# Patient Record
Sex: FEMALE | Race: WHITE | NOT HISPANIC OR LATINO | URBAN - METROPOLITAN AREA
[De-identification: names, ages, dates, MRNs, and addresses within clinical notes are randomized per-mention and may not be internally consistent; named-entity substitution may affect disease eponyms.]

---

## 2017-08-13 ENCOUNTER — EMERGENCY (EMERGENCY)
Facility: HOSPITAL | Age: 77
LOS: 1 days | Discharge: HOME | End: 2017-08-13
Attending: EMERGENCY MEDICINE | Admitting: EMERGENCY MEDICINE
Payer: MEDICARE

## 2017-08-13 VITALS
HEIGHT: 61 IN | RESPIRATION RATE: 14 BRPM | TEMPERATURE: 104 F | WEIGHT: 147.93 LBS | DIASTOLIC BLOOD PRESSURE: 73 MMHG | OXYGEN SATURATION: 98 % | SYSTOLIC BLOOD PRESSURE: 138 MMHG | HEART RATE: 88 BPM

## 2017-08-13 VITALS
SYSTOLIC BLOOD PRESSURE: 120 MMHG | TEMPERATURE: 99 F | OXYGEN SATURATION: 99 % | RESPIRATION RATE: 16 BRPM | HEART RATE: 70 BPM | DIASTOLIC BLOOD PRESSURE: 54 MMHG

## 2017-08-13 DIAGNOSIS — Z90.721 ACQUIRED ABSENCE OF OVARIES, UNILATERAL: Chronic | ICD-10-CM

## 2017-08-13 LAB
ALBUMIN SERPL ELPH-MCNC: 3.2 G/DL — LOW (ref 3.3–5)
ALP SERPL-CCNC: 95 U/L — SIGNIFICANT CHANGE UP (ref 30–120)
ALT FLD-CCNC: 36 U/L DA — SIGNIFICANT CHANGE UP (ref 10–60)
ANION GAP SERPL CALC-SCNC: 11 MMOL/L — SIGNIFICANT CHANGE UP (ref 5–17)
APPEARANCE UR: CLEAR — SIGNIFICANT CHANGE UP
APTT BLD: 29.6 SEC — SIGNIFICANT CHANGE UP (ref 27.5–37.4)
AST SERPL-CCNC: 26 U/L — SIGNIFICANT CHANGE UP (ref 10–40)
BACTERIA # UR AUTO: ABNORMAL
BASOPHILS # BLD AUTO: 0.2 K/UL — SIGNIFICANT CHANGE UP (ref 0–0.2)
BASOPHILS NFR BLD AUTO: 1.7 % — SIGNIFICANT CHANGE UP (ref 0–2)
BILIRUB SERPL-MCNC: 0.6 MG/DL — SIGNIFICANT CHANGE UP (ref 0.2–1.2)
BILIRUB UR-MCNC: NEGATIVE — SIGNIFICANT CHANGE UP
BUN SERPL-MCNC: 18 MG/DL — SIGNIFICANT CHANGE UP (ref 7–23)
CALCIUM SERPL-MCNC: 9.1 MG/DL — SIGNIFICANT CHANGE UP (ref 8.4–10.5)
CHLORIDE SERPL-SCNC: 100 MMOL/L — SIGNIFICANT CHANGE UP (ref 96–108)
CO2 SERPL-SCNC: 26 MMOL/L — SIGNIFICANT CHANGE UP (ref 22–31)
COLOR SPEC: YELLOW — SIGNIFICANT CHANGE UP
CREAT SERPL-MCNC: 1.03 MG/DL — SIGNIFICANT CHANGE UP (ref 0.5–1.3)
DIFF PNL FLD: ABNORMAL
EOSINOPHIL # BLD AUTO: 0.1 K/UL — SIGNIFICANT CHANGE UP (ref 0–0.5)
EOSINOPHIL NFR BLD AUTO: 0.7 % — SIGNIFICANT CHANGE UP (ref 0–6)
EPI CELLS # UR: SIGNIFICANT CHANGE UP
GLUCOSE SERPL-MCNC: 122 MG/DL — HIGH (ref 70–99)
GLUCOSE UR QL: NEGATIVE MG/DL — SIGNIFICANT CHANGE UP
HCT VFR BLD CALC: 41.1 % — SIGNIFICANT CHANGE UP (ref 34.5–45)
HGB BLD-MCNC: 13.8 G/DL — SIGNIFICANT CHANGE UP (ref 11.5–15.5)
INR BLD: 1.03 RATIO — SIGNIFICANT CHANGE UP (ref 0.88–1.16)
KETONES UR-MCNC: NEGATIVE — SIGNIFICANT CHANGE UP
LACTATE SERPL-SCNC: 1.1 MMOL/L — SIGNIFICANT CHANGE UP (ref 0.7–2)
LEUKOCYTE ESTERASE UR-ACNC: ABNORMAL
LYMPHOCYTES # BLD AUTO: 0.3 K/UL — LOW (ref 1–3.3)
LYMPHOCYTES # BLD AUTO: 4 % — LOW (ref 13–44)
MANUAL DIF COMMENT BLD-IMP: SIGNIFICANT CHANGE UP
MANUAL SMEAR VERIFICATION: SIGNIFICANT CHANGE UP
MCHC RBC-ENTMCNC: 30.5 PG — SIGNIFICANT CHANGE UP (ref 27–34)
MCHC RBC-ENTMCNC: 33.6 GM/DL — SIGNIFICANT CHANGE UP (ref 32–36)
MCV RBC AUTO: 90.7 FL — SIGNIFICANT CHANGE UP (ref 80–100)
MONOCYTES # BLD AUTO: 0.5 K/UL — SIGNIFICANT CHANGE UP (ref 0–0.9)
MONOCYTES NFR BLD AUTO: 3 % — SIGNIFICANT CHANGE UP (ref 2–14)
NEUTROPHILS # BLD AUTO: 11.4 K/UL — HIGH (ref 1.8–7.4)
NEUTROPHILS NFR BLD AUTO: 90 % — HIGH (ref 43–77)
NEUTS BAND # BLD: 3 % — SIGNIFICANT CHANGE UP (ref 0–8)
NITRITE UR-MCNC: NEGATIVE — SIGNIFICANT CHANGE UP
PH UR: 5 — SIGNIFICANT CHANGE UP (ref 5–8)
PLAT MORPH BLD: NORMAL — SIGNIFICANT CHANGE UP
PLATELET # BLD AUTO: 231 K/UL — SIGNIFICANT CHANGE UP (ref 150–400)
POTASSIUM SERPL-MCNC: 4.1 MMOL/L — SIGNIFICANT CHANGE UP (ref 3.5–5.3)
POTASSIUM SERPL-SCNC: 4.1 MMOL/L — SIGNIFICANT CHANGE UP (ref 3.5–5.3)
PROT SERPL-MCNC: 7.2 G/DL — SIGNIFICANT CHANGE UP (ref 6–8.3)
PROT UR-MCNC: 15 MG/DL
PROTHROM AB SERPL-ACNC: 11.2 SEC — SIGNIFICANT CHANGE UP (ref 9.8–12.7)
RBC # BLD: 4.53 M/UL — SIGNIFICANT CHANGE UP (ref 3.8–5.2)
RBC # FLD: 11.2 % — SIGNIFICANT CHANGE UP (ref 10.3–14.5)
RBC BLD AUTO: NORMAL — SIGNIFICANT CHANGE UP
RBC CASTS # UR COMP ASSIST: ABNORMAL /HPF (ref 0–4)
SODIUM SERPL-SCNC: 137 MMOL/L — SIGNIFICANT CHANGE UP (ref 135–145)
SP GR SPEC: 1.01 — SIGNIFICANT CHANGE UP (ref 1.01–1.02)
UROBILINOGEN FLD QL: NEGATIVE MG/DL — SIGNIFICANT CHANGE UP
WBC # BLD: 12.5 K/UL — HIGH (ref 3.8–10.5)
WBC # FLD AUTO: 12.5 K/UL — HIGH (ref 3.8–10.5)
WBC UR QL: SIGNIFICANT CHANGE UP

## 2017-08-13 PROCEDURE — 71020: CPT | Mod: 26

## 2017-08-13 PROCEDURE — 87040 BLOOD CULTURE FOR BACTERIA: CPT

## 2017-08-13 PROCEDURE — 85730 THROMBOPLASTIN TIME PARTIAL: CPT

## 2017-08-13 PROCEDURE — 71046 X-RAY EXAM CHEST 2 VIEWS: CPT

## 2017-08-13 PROCEDURE — 99285 EMERGENCY DEPT VISIT HI MDM: CPT

## 2017-08-13 PROCEDURE — 87086 URINE CULTURE/COLONY COUNT: CPT

## 2017-08-13 PROCEDURE — 86618 LYME DISEASE ANTIBODY: CPT

## 2017-08-13 PROCEDURE — 81001 URINALYSIS AUTO W/SCOPE: CPT

## 2017-08-13 PROCEDURE — 96374 THER/PROPH/DIAG INJ IV PUSH: CPT

## 2017-08-13 PROCEDURE — 99284 EMERGENCY DEPT VISIT MOD MDM: CPT | Mod: 25

## 2017-08-13 PROCEDURE — 93010 ELECTROCARDIOGRAM REPORT: CPT

## 2017-08-13 PROCEDURE — 85610 PROTHROMBIN TIME: CPT

## 2017-08-13 PROCEDURE — 93005 ELECTROCARDIOGRAM TRACING: CPT

## 2017-08-13 PROCEDURE — 85027 COMPLETE CBC AUTOMATED: CPT

## 2017-08-13 PROCEDURE — 83605 ASSAY OF LACTIC ACID: CPT

## 2017-08-13 PROCEDURE — 80053 COMPREHEN METABOLIC PANEL: CPT

## 2017-08-13 RX ORDER — ACETAMINOPHEN 500 MG
650 TABLET ORAL ONCE
Qty: 0 | Refills: 0 | Status: COMPLETED | OUTPATIENT
Start: 2017-08-13 | End: 2017-08-13

## 2017-08-13 RX ORDER — CEFTRIAXONE 500 MG/1
1 INJECTION, POWDER, FOR SOLUTION INTRAMUSCULAR; INTRAVENOUS ONCE
Qty: 0 | Refills: 0 | Status: COMPLETED | OUTPATIENT
Start: 2017-08-13 | End: 2017-08-13

## 2017-08-13 RX ORDER — SODIUM CHLORIDE 9 MG/ML
1000 INJECTION INTRAMUSCULAR; INTRAVENOUS; SUBCUTANEOUS
Qty: 0 | Refills: 0 | Status: COMPLETED | OUTPATIENT
Start: 2017-08-13 | End: 2017-08-13

## 2017-08-13 RX ADMIN — SODIUM CHLORIDE 1000 MILLILITER(S): 9 INJECTION INTRAMUSCULAR; INTRAVENOUS; SUBCUTANEOUS at 12:13

## 2017-08-13 RX ADMIN — SODIUM CHLORIDE 1000 MILLILITER(S): 9 INJECTION INTRAMUSCULAR; INTRAVENOUS; SUBCUTANEOUS at 09:51

## 2017-08-13 RX ADMIN — Medication 650 MILLIGRAM(S): at 09:53

## 2017-08-13 RX ADMIN — SODIUM CHLORIDE 1000 MILLILITER(S): 9 INJECTION INTRAMUSCULAR; INTRAVENOUS; SUBCUTANEOUS at 11:00

## 2017-08-13 RX ADMIN — CEFTRIAXONE 100 GRAM(S): 500 INJECTION, POWDER, FOR SOLUTION INTRAMUSCULAR; INTRAVENOUS at 09:53

## 2017-08-13 NOTE — ED PROVIDER NOTE - CHPI ED SYMPTOMS NEG
no dizziness/no pain/no nausea/no vomiting/no weakness/no numbness/no tingling/no decreased eating/drinking

## 2017-08-13 NOTE — ED PROVIDER NOTE - OBJECTIVE STATEMENT
76 yo female co fever for 2 days and shaking chills for 2 hrs. T=103 now. Denies headache, cough, SOB, vomiting, diarrhea dysuria or hematuria. Recently treated for gastroenteritis 2 weeks ago. Concerned about Lyme since she was in [facility] last week. Denies any tick bite.

## 2017-08-13 NOTE — ED PROVIDER NOTE - FAMILY HISTORY
<<-----Click on this checkbox to enter Family History Family history of stroke     Family history of diabetes mellitus     Father  Still living? Unknown  Family history of lung cancer, Age at diagnosis: Age Unknown

## 2017-08-13 NOTE — ED PROVIDER NOTE - PROGRESS NOTE DETAILS
Afebrile. Feels better. Ate lunch, drank fluids. Plan - Will go home and see Dr. Orosco tomorrow for results of Lyme and Blood cultures.

## 2017-08-13 NOTE — ED PROVIDER NOTE - MEDICAL DECISION MAKING DETAILS
78 yo emale with fever and chills to 103 today. PE other wise unrevealing. Plan - Labs, sepsis workup, IV fluids, tylenol, antibiotic. Reeval.

## 2017-08-14 LAB
B BURGDOR C6 AB SER-ACNC: NEGATIVE — SIGNIFICANT CHANGE UP
B BURGDOR IGG+IGM SER-ACNC: <0.01 INDEX — SIGNIFICANT CHANGE UP (ref 0.01–0.89)
CULTURE RESULTS: NO GROWTH — SIGNIFICANT CHANGE UP
SPECIMEN SOURCE: SIGNIFICANT CHANGE UP

## 2017-08-18 LAB
CULTURE RESULTS: SIGNIFICANT CHANGE UP
CULTURE RESULTS: SIGNIFICANT CHANGE UP
SPECIMEN SOURCE: SIGNIFICANT CHANGE UP
SPECIMEN SOURCE: SIGNIFICANT CHANGE UP

## 2021-12-17 ENCOUNTER — TRANSCRIPTION ENCOUNTER (OUTPATIENT)
Age: 81
End: 2021-12-17

## 2021-12-27 ENCOUNTER — INPATIENT (INPATIENT)
Facility: HOSPITAL | Age: 81
LOS: 2 days | Discharge: HOME | DRG: 299 | End: 2021-12-30
Attending: INTERNAL MEDICINE | Admitting: INTERNAL MEDICINE
Payer: MEDICARE

## 2021-12-27 VITALS
OXYGEN SATURATION: 96 % | HEART RATE: 86 BPM | DIASTOLIC BLOOD PRESSURE: 71 MMHG | TEMPERATURE: 98 F | SYSTOLIC BLOOD PRESSURE: 116 MMHG | RESPIRATION RATE: 16 BRPM | HEIGHT: 61 IN | WEIGHT: 156.09 LBS

## 2021-12-27 DIAGNOSIS — I82.409 ACUTE EMBOLISM AND THROMBOSIS OF UNSPECIFIED DEEP VEINS OF UNSPECIFIED LOWER EXTREMITY: ICD-10-CM

## 2021-12-27 DIAGNOSIS — E78.5 HYPERLIPIDEMIA, UNSPECIFIED: ICD-10-CM

## 2021-12-27 DIAGNOSIS — R07.9 CHEST PAIN, UNSPECIFIED: ICD-10-CM

## 2021-12-27 DIAGNOSIS — Z90.721 ACQUIRED ABSENCE OF OVARIES, UNILATERAL: Chronic | ICD-10-CM

## 2021-12-27 DIAGNOSIS — E11.9 TYPE 2 DIABETES MELLITUS WITHOUT COMPLICATIONS: ICD-10-CM

## 2021-12-27 DIAGNOSIS — E03.9 HYPOTHYROIDISM, UNSPECIFIED: ICD-10-CM

## 2021-12-27 DIAGNOSIS — R09.89 OTHER SPECIFIED SYMPTOMS AND SIGNS INVOLVING THE CIRCULATORY AND RESPIRATORY SYSTEMS: ICD-10-CM

## 2021-12-27 DIAGNOSIS — I26.99 OTHER PULMONARY EMBOLISM WITHOUT ACUTE COR PULMONALE: ICD-10-CM

## 2021-12-27 DIAGNOSIS — Z29.9 ENCOUNTER FOR PROPHYLACTIC MEASURES, UNSPECIFIED: ICD-10-CM

## 2021-12-27 DIAGNOSIS — D72.829 ELEVATED WHITE BLOOD CELL COUNT, UNSPECIFIED: ICD-10-CM

## 2021-12-27 DIAGNOSIS — J45.909 UNSPECIFIED ASTHMA, UNCOMPLICATED: ICD-10-CM

## 2021-12-27 LAB
ALBUMIN SERPL ELPH-MCNC: 3 G/DL — LOW (ref 3.3–5)
ALP SERPL-CCNC: 100 U/L — SIGNIFICANT CHANGE UP (ref 30–120)
ALT FLD-CCNC: 24 U/L DA — SIGNIFICANT CHANGE UP (ref 10–60)
ANION GAP SERPL CALC-SCNC: 9 MMOL/L — SIGNIFICANT CHANGE UP (ref 5–17)
APPEARANCE UR: CLEAR — SIGNIFICANT CHANGE UP
APTT BLD: 33.9 SEC — SIGNIFICANT CHANGE UP (ref 27.5–35.5)
AST SERPL-CCNC: 17 U/L — SIGNIFICANT CHANGE UP (ref 10–40)
BASOPHILS # BLD AUTO: 0.03 K/UL — SIGNIFICANT CHANGE UP (ref 0–0.2)
BASOPHILS NFR BLD AUTO: 0.2 % — SIGNIFICANT CHANGE UP (ref 0–2)
BILIRUB SERPL-MCNC: 0.3 MG/DL — SIGNIFICANT CHANGE UP (ref 0.2–1.2)
BILIRUB UR-MCNC: NEGATIVE — SIGNIFICANT CHANGE UP
BUN SERPL-MCNC: 23 MG/DL — SIGNIFICANT CHANGE UP (ref 7–23)
CALCIUM SERPL-MCNC: 8.8 MG/DL — SIGNIFICANT CHANGE UP (ref 8.4–10.5)
CHLORIDE SERPL-SCNC: 101 MMOL/L — SIGNIFICANT CHANGE UP (ref 96–108)
CO2 SERPL-SCNC: 27 MMOL/L — SIGNIFICANT CHANGE UP (ref 22–31)
COLOR SPEC: YELLOW — SIGNIFICANT CHANGE UP
CREAT SERPL-MCNC: 0.86 MG/DL — SIGNIFICANT CHANGE UP (ref 0.5–1.3)
DIFF PNL FLD: ABNORMAL
EOSINOPHIL # BLD AUTO: 0.03 K/UL — SIGNIFICANT CHANGE UP (ref 0–0.5)
EOSINOPHIL NFR BLD AUTO: 0.2 % — SIGNIFICANT CHANGE UP (ref 0–6)
GLUCOSE SERPL-MCNC: 110 MG/DL — HIGH (ref 70–99)
GLUCOSE UR QL: NEGATIVE MG/DL — SIGNIFICANT CHANGE UP
HCT VFR BLD CALC: 40 % — SIGNIFICANT CHANGE UP (ref 34.5–45)
HGB BLD-MCNC: 13 G/DL — SIGNIFICANT CHANGE UP (ref 11.5–15.5)
IMM GRANULOCYTES NFR BLD AUTO: 0.5 % — SIGNIFICANT CHANGE UP (ref 0–1.5)
INR BLD: 1.19 RATIO — HIGH (ref 0.88–1.16)
KETONES UR-MCNC: NEGATIVE — SIGNIFICANT CHANGE UP
LACTATE SERPL-SCNC: 0.6 MMOL/L — LOW (ref 0.7–2)
LEUKOCYTE ESTERASE UR-ACNC: NEGATIVE — SIGNIFICANT CHANGE UP
LYMPHOCYTES # BLD AUTO: 1.32 K/UL — SIGNIFICANT CHANGE UP (ref 1–3.3)
LYMPHOCYTES # BLD AUTO: 9.6 % — LOW (ref 13–44)
MCHC RBC-ENTMCNC: 29.8 PG — SIGNIFICANT CHANGE UP (ref 27–34)
MCHC RBC-ENTMCNC: 32.5 GM/DL — SIGNIFICANT CHANGE UP (ref 32–36)
MCV RBC AUTO: 91.7 FL — SIGNIFICANT CHANGE UP (ref 80–100)
MONOCYTES # BLD AUTO: 1.18 K/UL — HIGH (ref 0–0.9)
MONOCYTES NFR BLD AUTO: 8.6 % — SIGNIFICANT CHANGE UP (ref 2–14)
NEUTROPHILS # BLD AUTO: 11.17 K/UL — HIGH (ref 1.8–7.4)
NEUTROPHILS NFR BLD AUTO: 80.9 % — HIGH (ref 43–77)
NITRITE UR-MCNC: NEGATIVE — SIGNIFICANT CHANGE UP
NRBC # BLD: 0 /100 WBCS — SIGNIFICANT CHANGE UP (ref 0–0)
NT-PROBNP SERPL-SCNC: 124 PG/ML — SIGNIFICANT CHANGE UP (ref 0–450)
PH UR: 5 — SIGNIFICANT CHANGE UP (ref 5–8)
PLATELET # BLD AUTO: 207 K/UL — SIGNIFICANT CHANGE UP (ref 150–400)
POTASSIUM SERPL-MCNC: 4.3 MMOL/L — SIGNIFICANT CHANGE UP (ref 3.5–5.3)
POTASSIUM SERPL-SCNC: 4.3 MMOL/L — SIGNIFICANT CHANGE UP (ref 3.5–5.3)
PROT SERPL-MCNC: 7.2 G/DL — SIGNIFICANT CHANGE UP (ref 6–8.3)
PROT UR-MCNC: 15 MG/DL
PROTHROM AB SERPL-ACNC: 14.3 SEC — HIGH (ref 10.6–13.6)
RBC # BLD: 4.36 M/UL — SIGNIFICANT CHANGE UP (ref 3.8–5.2)
RBC # FLD: 12.3 % — SIGNIFICANT CHANGE UP (ref 10.3–14.5)
SARS-COV-2 RNA SPEC QL NAA+PROBE: SIGNIFICANT CHANGE UP
SODIUM SERPL-SCNC: 137 MMOL/L — SIGNIFICANT CHANGE UP (ref 135–145)
SP GR SPEC: 1.01 — SIGNIFICANT CHANGE UP (ref 1.01–1.02)
TROPONIN I, HIGH SENSITIVITY RESULT: <4 NG/L — SIGNIFICANT CHANGE UP
UROBILINOGEN FLD QL: NEGATIVE MG/DL — SIGNIFICANT CHANGE UP
WBC # BLD: 13.8 K/UL — HIGH (ref 3.8–10.5)
WBC # FLD AUTO: 13.8 K/UL — HIGH (ref 3.8–10.5)

## 2021-12-27 PROCEDURE — 99285 EMERGENCY DEPT VISIT HI MDM: CPT

## 2021-12-27 PROCEDURE — 99223 1ST HOSP IP/OBS HIGH 75: CPT | Mod: AI

## 2021-12-27 PROCEDURE — 71250 CT THORAX DX C-: CPT | Mod: 26,MA

## 2021-12-27 PROCEDURE — 93970 EXTREMITY STUDY: CPT | Mod: 26

## 2021-12-27 PROCEDURE — 93010 ELECTROCARDIOGRAM REPORT: CPT

## 2021-12-27 PROCEDURE — 71045 X-RAY EXAM CHEST 1 VIEW: CPT | Mod: 26

## 2021-12-27 RX ORDER — ENOXAPARIN SODIUM 100 MG/ML
70 INJECTION SUBCUTANEOUS EVERY 12 HOURS
Refills: 0 | Status: DISCONTINUED | OUTPATIENT
Start: 2021-12-28 | End: 2021-12-28

## 2021-12-27 RX ORDER — ATORVASTATIN CALCIUM 80 MG/1
20 TABLET, FILM COATED ORAL AT BEDTIME
Refills: 0 | Status: DISCONTINUED | OUTPATIENT
Start: 2021-12-27 | End: 2021-12-30

## 2021-12-27 RX ORDER — SODIUM CHLORIDE 9 MG/ML
1000 INJECTION, SOLUTION INTRAVENOUS
Refills: 0 | Status: DISCONTINUED | OUTPATIENT
Start: 2021-12-27 | End: 2021-12-29

## 2021-12-27 RX ORDER — MORPHINE SULFATE 50 MG/1
4 CAPSULE, EXTENDED RELEASE ORAL EVERY 4 HOURS
Refills: 0 | Status: DISCONTINUED | OUTPATIENT
Start: 2021-12-27 | End: 2021-12-30

## 2021-12-27 RX ORDER — ENOXAPARIN SODIUM 100 MG/ML
70 INJECTION SUBCUTANEOUS ONCE
Refills: 0 | Status: COMPLETED | OUTPATIENT
Start: 2021-12-27 | End: 2021-12-27

## 2021-12-27 RX ORDER — CEFTRIAXONE 500 MG/1
1000 INJECTION, POWDER, FOR SOLUTION INTRAMUSCULAR; INTRAVENOUS ONCE
Refills: 0 | Status: COMPLETED | OUTPATIENT
Start: 2021-12-27 | End: 2021-12-27

## 2021-12-27 RX ORDER — KETOROLAC TROMETHAMINE 30 MG/ML
15 SYRINGE (ML) INJECTION ONCE
Refills: 0 | Status: DISCONTINUED | OUTPATIENT
Start: 2021-12-27 | End: 2021-12-27

## 2021-12-27 RX ORDER — ONDANSETRON 8 MG/1
4 TABLET, FILM COATED ORAL ONCE
Refills: 0 | Status: COMPLETED | OUTPATIENT
Start: 2021-12-27 | End: 2021-12-27

## 2021-12-27 RX ORDER — MORPHINE SULFATE 50 MG/1
2 CAPSULE, EXTENDED RELEASE ORAL EVERY 4 HOURS
Refills: 0 | Status: DISCONTINUED | OUTPATIENT
Start: 2021-12-27 | End: 2021-12-30

## 2021-12-27 RX ORDER — ACETAMINOPHEN 500 MG
650 TABLET ORAL EVERY 6 HOURS
Refills: 0 | Status: DISCONTINUED | OUTPATIENT
Start: 2021-12-27 | End: 2021-12-30

## 2021-12-27 RX ORDER — SODIUM CHLORIDE 9 MG/ML
1500 INJECTION INTRAMUSCULAR; INTRAVENOUS; SUBCUTANEOUS ONCE
Refills: 0 | Status: COMPLETED | OUTPATIENT
Start: 2021-12-27 | End: 2021-12-27

## 2021-12-27 RX ORDER — MORPHINE SULFATE 50 MG/1
2 CAPSULE, EXTENDED RELEASE ORAL ONCE
Refills: 0 | Status: DISCONTINUED | OUTPATIENT
Start: 2021-12-27 | End: 2021-12-27

## 2021-12-27 RX ORDER — ALBUTEROL 90 UG/1
2 AEROSOL, METERED ORAL EVERY 6 HOURS
Refills: 0 | Status: DISCONTINUED | OUTPATIENT
Start: 2021-12-27 | End: 2021-12-30

## 2021-12-27 RX ORDER — AZITHROMYCIN 500 MG/1
500 TABLET, FILM COATED ORAL ONCE
Refills: 0 | Status: COMPLETED | OUTPATIENT
Start: 2021-12-27 | End: 2021-12-27

## 2021-12-27 RX ORDER — LEVOTHYROXINE SODIUM 125 MCG
50 TABLET ORAL DAILY
Refills: 0 | Status: DISCONTINUED | OUTPATIENT
Start: 2021-12-27 | End: 2021-12-30

## 2021-12-27 RX ADMIN — AZITHROMYCIN 255 MILLIGRAM(S): 500 TABLET, FILM COATED ORAL at 20:41

## 2021-12-27 RX ADMIN — CEFTRIAXONE 100 MILLIGRAM(S): 500 INJECTION, POWDER, FOR SOLUTION INTRAMUSCULAR; INTRAVENOUS at 20:38

## 2021-12-27 RX ADMIN — MORPHINE SULFATE 2 MILLIGRAM(S): 50 CAPSULE, EXTENDED RELEASE ORAL at 16:50

## 2021-12-27 RX ADMIN — ENOXAPARIN SODIUM 70 MILLIGRAM(S): 100 INJECTION SUBCUTANEOUS at 19:09

## 2021-12-27 RX ADMIN — SODIUM CHLORIDE 1500 MILLILITER(S): 9 INJECTION INTRAMUSCULAR; INTRAVENOUS; SUBCUTANEOUS at 16:20

## 2021-12-27 RX ADMIN — MORPHINE SULFATE 2 MILLIGRAM(S): 50 CAPSULE, EXTENDED RELEASE ORAL at 16:20

## 2021-12-27 RX ADMIN — CEFTRIAXONE 1000 MILLIGRAM(S): 500 INJECTION, POWDER, FOR SOLUTION INTRAMUSCULAR; INTRAVENOUS at 20:53

## 2021-12-27 RX ADMIN — Medication 15 MILLIGRAM(S): at 16:20

## 2021-12-27 RX ADMIN — SODIUM CHLORIDE 1500 MILLILITER(S): 9 INJECTION INTRAMUSCULAR; INTRAVENOUS; SUBCUTANEOUS at 17:55

## 2021-12-27 RX ADMIN — SODIUM CHLORIDE 125 MILLILITER(S): 9 INJECTION, SOLUTION INTRAVENOUS at 23:09

## 2021-12-27 RX ADMIN — Medication 15 MILLIGRAM(S): at 16:50

## 2021-12-27 RX ADMIN — ONDANSETRON 4 MILLIGRAM(S): 8 TABLET, FILM COATED ORAL at 16:20

## 2021-12-27 NOTE — ED PROVIDER NOTE - NSICDXFAMILYHX_GEN_ALL_CORE_FT
FAMILY HISTORY:  Family history of diabetes mellitus  Family history of stroke    Father  Still living? Unknown  Family history of lung cancer, Age at diagnosis: Age Unknown

## 2021-12-27 NOTE — ED PROVIDER NOTE - OBJECTIVE STATEMENT
82 y/o female with a PMHx of asthma, DM2, HLD, hypothyroidism who presents to the ED with a cc of trouble breathing and left sided rib pain & intermittent spasms since yesterday afternoon. Pt states pain is worse with deep inspiration and lying down. Pt also has a cough with clear phlegm. Pt states she saw her pulmonologist on 12/22/2021, had a CXR, and was diagnosed with left lower lobe pneumonia. Pt has been taking her inhaler & Ceftin for pneumonia for the past 5 days. Pt also reports intermittent low-grade fevers yesterday; now resolved. Pt took Tylenol last night with no relief and Advil this morning with minimal relief for pain. Denies CP, abd pain, or any other symptoms. No other complaints at this time. Pulmonologist: Dr. Renaldo Coyne

## 2021-12-27 NOTE — H&P ADULT - ASSESSMENT
82 y/o F with PMH of DM type 2 currently off meds on diet control, Dyslipidemia, Asthma, and Hypothyroidism presented with left lower rib pain with cough.

## 2021-12-27 NOTE — ED ADULT NURSE NOTE - OBJECTIVE STATEMENT
"Yvonne Barron is a 24 year old female who is being evaluated via a billable video visit.      The patient has been notified of following:     \"This video visit will be conducted via a call between you and your physician/provider. We have found that certain health care needs can be provided without the need for an in-person physical exam.  This service lets us provide the care you need with a video conversation.  If a prescription is necessary we can send it directly to your pharmacy.  If lab work is needed we can place an order for that and you can then stop by our lab to have the test done at a later time.    Video visits are billed at different rates depending on your insurance coverage.  Please reach out to your insurance provider with any questions.    If during the course of the call the physician/provider feels a video visit is not appropriate, you will not be charged for this service.\"    Patient has given verbal consent for Video visit? Yes    How would you like to obtain your AVS? Leah    Patient would like the video invitation sent by: Send to e-mail at: [email or web address]    Will anyone else be joining your video visit? No        " "I have lt sided pain pneumonia that I am on antibiotics for -5days now. Yesterday developed severe lt sided pain"    patient denies any trauma in the area, Labs drawn & sent results pending. will continue to monitor.

## 2021-12-27 NOTE — H&P ADULT - PROBLEM SELECTOR PLAN 2
CT without contrast & presenting complaint both suggestive of a pulmonary infarct, more likely in the setting of acute DVT, patient is allergic to IV contrast, VQ scan was ordered by ED team, hemodynamically stable, no change in management, admitted to telemetry with continuous SPO2, pain control, in addition to the above anticoagulation, TTE in am for RV evaluation, patient received Azithromycin IVPB & Ceftriaxone one dose each by ED team, will hold off antibiotics as PNA is unlikely given the scenario & imaging results, check procalcitonin, trend TLC & clinical progress, discussed with patient, pulmonary consult with Dr. Underwood was called.

## 2021-12-27 NOTE — H&P ADULT - NSHPPHYSICALEXAM_GEN_ALL_CORE
-    Vital Signs Last 24 Hrs  T(C): 37 (27 Dec 2021 20:31), Max: 37 (27 Dec 2021 20:31)  T(F): 98.6 (27 Dec 2021 20:31), Max: 98.6 (27 Dec 2021 20:31)  HR: 83 (27 Dec 2021 20:31) (83 - 86)  BP: 119/83 (27 Dec 2021 20:31) (116/71 - 119/83)  BP(mean): --  RR: 20 (27 Dec 2021 20:31) (16 - 20)  SpO2: 99% (27 Dec 2021 20:31) (96% - 99%)          PHYSICAL EXAM:  		  GENERAL: NAD, well-groomed, well-developed.  HEAD:  Atraumatic, Norm cephalic.  EYES: PERRLA, conjunctiva clear.  ENMT: no nasal discharge, MMM.   NECK: Supple, No JVD.  NERVOUS SYSTEM:  Alert & oriented X3, neurologically intact grossly.  CHEST/LUNG: Good air entry allover, B/L coarse inp rales on lung bases, more on the left, no rhonchi, or wheezing.  HEART: Normal S1 & S2, no murmurs, or extra sounds.  ABDOMEN: Soft, obese, non-tender, non-distended; bowel sounds present, no palpable masses or organomegaly.  EXTREMITIES:  No clubbing, cyanosis, or edema.  VASCULAR: 2+ radial, DPA / PTA pulses B/L.  SKIN: No rashes or lesions.  PSYCH: normal affect & behavior.

## 2021-12-27 NOTE — H&P ADULT - PROBLEM SELECTOR PLAN 1
left popliteal, posterior tibial, and peroneal, provoked, about 2 weeks ago, was started on treatment dose LMWH by ED team, will continue at 70 mg sub Q every 12h in transition to a DOAC, discussed with patient.

## 2021-12-27 NOTE — H&P ADULT - PROBLEM SELECTOR PLAN 3
with neutrophilia, ML related to the above, presence of an infection process is unlikely as stated above, patient just finished 5 days of Ceftin as an outpatient, trend TLC & temp off antibiotics, f/u cultures obtained earlier by ED team, procalcitonin level check.

## 2021-12-27 NOTE — H&P ADULT - PROBLEM SELECTOR PLAN 4
currently off Metformin & on successful diet control as per patient, random venous plasma glucose today within normal, so was cap glucose check, consistent carbohydrate diet without snacks, POC glucose before meals & at bedtime.

## 2021-12-27 NOTE — H&P ADULT - NSHPREVIEWOFSYSTEMS_GEN_ALL_CORE
-    CONSTITUTIONAL: (+) low grade fever, no chills.  EYES: No eye pain, visual disturbances, or discharge.  ENMT:  No difficulty hearing, vertigo, sinus or throat pain.  NECK: No pain or stiffness.	  RESPIRATORY: (+) cough with wheezing, no hemoptysis; No shortness of breath.  CARDIOVASCULAR: No palpitations, dizziness, or leg swelling.  GASTROINTESTINAL: No abdominal pain, no nausea, vomiting, or hematemesis; No diarrhea or Change in bowel habits. No melena or hematochezia.  GENITOURINARY: No dysuria, frequency, hematuria, or incontinence.  NEUROLOGICAL: No headaches, focal muscle weakness, numbness, or tremors.  SKIN: No itching, burning or rashes.  MUSCULOSKELETAL: No joint swelling or pain.  PSYCHIATRIC: No depression, anxiety, or agitation.  HEME/LYMPH: No easy bruising, bleeding gums, or nose bleed.  ALLERGY AND IMMUNOLOGIC: No hives or eczema.

## 2021-12-27 NOTE — PATIENT PROFILE ADULT - FALL HARM RISK - HARM RISK INTERVENTIONS

## 2021-12-27 NOTE — H&P ADULT - PROBLEM SELECTOR PLAN 5
on Simvastatin 40 mg at bedtime which is inappropriate, changed to an equivalent dose of Atorvastatin.

## 2021-12-27 NOTE — H&P ADULT - NSHPLABSRESULTS_GEN_ALL_CORE
-  Lactate Trend   @ 16:46 Lactate:0.6                          13.0   13.80 )-----------( 207      ( 27 Dec 2021 16:46 )             40.0                137  |  101  |  23  ----------------------------<  110<H>  4.3   |  27  |  0.86    Ca    8.8      27 Dec 2021 16:46    TPro  7.2  /  Alb  3.0<L>  /  TBili  0.3  /  DBili  x   /  AST  17  /  ALT  24  /  AlkPhos  100         Urinalysis Basic - ( 27 Dec 2021 16:47 )  Color: Yellow / Appearance: Clear / S.010 / pH: x  Gluc: x / Ketone: Negative  / Bili: Negative / Urobili: Negative mg/dL   Blood: x / Protein: 15 mg/dL / Nitrite: Negative   Leuk Esterase: Negative / RBC: 0-2 /HPF / WBC 0-2   Sq Epi: x / Non Sq Epi: Occasional / Bacteria: x    PT/INR - ( 27 Dec 2021 16:46 )   PT: 14.3 sec;   INR: 1.19 ratio    PTT - ( 27 Dec 2021 16:46 )  PTT:33.9 sec  CAPILLARY BLOOD GLUCOSE  POCT Blood Glucose.: 89 mg/dL (27 Dec 2021 17:13)  COVID-19 PCR: NotDetec (27 Dec 2021 16:57)    D-Dimer Assay, Quantitative (21 @ 16:46)   D-Dimer Assay, Quantitative: 864 ng/mL DDU   Serum Pro-Brain Natriuretic Peptide (21 @ 19:18)   Serum Pro-Brain Natriuretic Peptide: 124 pg/mL       CT CHEST                        PROCEDURE DATE:  2021    INTERPRETATION:  CLINICAL INDICATION: Left rib pain.  Axial CT images of the chest are obtained without intravenous   administration of contrast.  No prior chest CTs are available for comparison.  No enlarged axillary or mediastinal lymph nodes. Multiple small   subcentimeter mediastinal and hilar lymph nodes. Right hilar partially   calcified lymph nodes are suggestive of prior granulomatous infection.  Heart size is normal. No pericardial effusion. Vascular calcifications   with involvement of the aorta and the coronary arteries.  Trace left pleural effusion.  Evaluation of the upper abdomen demonstrate partially imaged hepatic cyst.  Evaluation of the lungs demonstrate left lower lung areas of linear or   subsegmental atelectasis. Adjacent areas of groundglass with an adjacent   ill-defined left lung base nodular component at the left costophrenic   angle on image 103 of series 3measuring about 2.7 cm. Minimal right lung   base linear atelectasis. Right lower lobe calcified granuloma.  No central endobronchial lesions.  Degenerative changes of the spine.  IMPRESSION: Trace left pleural effusion with left lower lung areas of   atelectasis.  Adjacent groundglass within the left lower lobe at the left lung base   with a nodular component at the left costophrenic angle can be seen in   the setting of pulmonary infarction. CT pulmonary angiogram is   recommended for complete evaluation.       US DPLX LWR EXT VEINS COMPL BI                        PROCEDURE DATE:  2021    INTERPRETATION:  CLINICAL INFORMATION:  COMPARISON: None available.  RIGHT:  Normal compressibility of the RIGHT common femoral, femoral and popliteal   veins.  Doppler examination shows normal spontaneous and phasic flow.  No RIGHTcalf vein thrombosis is detected.  LEFT:  Normal compressibility of the LEFT common femoral, and femoral veins.   Intraluminal thrombus is present in the popliteal vein, posterior tibial   vein and peroneal veins with incomplete compressibility.  IMPRESSION:  DVT left popliteal, posterior tibial and peroneal veins.  No right leg DVT.  Findings were discussed with Dr. MOLLY FONTENOT [id number] 2021   6:46 PM by Dr. Petra Price with read back confirmation.      CXR:    Heart magnified by technique.  There is a small left base pleural pulmonary reaction which is new since   2017.  IMPRESSION: Small left base process.  Personally reviewed by me.      EKG:    As per my review shows SR at 80/min, normal IN & QTc intervals, low QRS voltage, normal duration & axis (+30), with normal transition, nonspecific ST-T abnormality.    -

## 2021-12-27 NOTE — ED PROVIDER NOTE - CLINICAL SUMMARY MEDICAL DECISION MAKING FREE TEXT BOX
pt presenting with left sided CP concern for cardiac pathology, vs pneumonia, vs possible PE given h/o travel. Will obtain screening labs, check d dimer, venous duplex and monitor

## 2021-12-27 NOTE — H&P ADULT - HISTORY OF PRESENT ILLNESS
This is an 80 y/o F with PMH of DM type 2 currently off meds on diet control, Dyslipidemia, Asthma, and Hypothyroidism who presented with 10/10 left lower rib sharp stabbing pain radiating to her lower posterior chest wall, worsens by deep breathing & lying down, improves when sitting up or standing, associated with occasional wheezing, and cough productive of scanty thin clear non bloody sputum. Pain & cough were started 2 weeks ago, following a flight from [place], but was much more mild, became much worse about 6 days ago, so she saw her pulmonary who obtained a CXR, diagnosed her as a LLL PNA, gave her Ceftin that she took for 5 days with marked improvement, but today started to worsen again, so she called her pulmonary who recommended an ED visit. Patient reports low grade fever yesterday, no chills, T max 99.6 F, but normal for the whole day today, denies other flu-like symptoms, tested negative for COVID-19 last week, no history of any blood clotting problem in the past, denies any leg/calf pains, patient is allergic to IV dye.

## 2021-12-27 NOTE — H&P ADULT - PROBLEM SELECTOR PLAN 8
already started on treatment dose LMWH in transition to a DOAC, no need for further VTE prophylaxis.

## 2021-12-27 NOTE — ED ADULT TRIAGE NOTE - CHIEF COMPLAINT QUOTE
"I have lt sided pain pneumonia that I am on antibiotics for -5days now. Yesterday developed severe lt sided pain"

## 2021-12-27 NOTE — ED PROVIDER NOTE - CONSTITUTIONAL, MLM
Well appearing, awake, alert, oriented to person, place, time/situation and in mild pain distress normal...

## 2021-12-28 LAB
ANION GAP SERPL CALC-SCNC: 8 MMOL/L — SIGNIFICANT CHANGE UP (ref 5–17)
BASOPHILS # BLD AUTO: 0.04 K/UL — SIGNIFICANT CHANGE UP (ref 0–0.2)
BASOPHILS NFR BLD AUTO: 0.4 % — SIGNIFICANT CHANGE UP (ref 0–2)
BUN SERPL-MCNC: 16 MG/DL — SIGNIFICANT CHANGE UP (ref 7–23)
CALCIUM SERPL-MCNC: 8.1 MG/DL — LOW (ref 8.4–10.5)
CHLORIDE SERPL-SCNC: 106 MMOL/L — SIGNIFICANT CHANGE UP (ref 96–108)
CO2 SERPL-SCNC: 25 MMOL/L — SIGNIFICANT CHANGE UP (ref 22–31)
CREAT SERPL-MCNC: 0.9 MG/DL — SIGNIFICANT CHANGE UP (ref 0.5–1.3)
CULTURE RESULTS: NO GROWTH — SIGNIFICANT CHANGE UP
EOSINOPHIL # BLD AUTO: 0.07 K/UL — SIGNIFICANT CHANGE UP (ref 0–0.5)
EOSINOPHIL NFR BLD AUTO: 0.7 % — SIGNIFICANT CHANGE UP (ref 0–6)
GLUCOSE SERPL-MCNC: 107 MG/DL — HIGH (ref 70–99)
HCT VFR BLD CALC: 32.5 % — LOW (ref 34.5–45)
HCYS SERPL-MCNC: 6.7 UMOL/L — SIGNIFICANT CHANGE UP
HGB BLD-MCNC: 10.5 G/DL — LOW (ref 11.5–15.5)
IMM GRANULOCYTES NFR BLD AUTO: 0.4 % — SIGNIFICANT CHANGE UP (ref 0–1.5)
LYMPHOCYTES # BLD AUTO: 1.36 K/UL — SIGNIFICANT CHANGE UP (ref 1–3.3)
LYMPHOCYTES # BLD AUTO: 13.6 % — SIGNIFICANT CHANGE UP (ref 13–44)
MCHC RBC-ENTMCNC: 29.6 PG — SIGNIFICANT CHANGE UP (ref 27–34)
MCHC RBC-ENTMCNC: 32.3 GM/DL — SIGNIFICANT CHANGE UP (ref 32–36)
MCV RBC AUTO: 91.5 FL — SIGNIFICANT CHANGE UP (ref 80–100)
MONOCYTES # BLD AUTO: 0.79 K/UL — SIGNIFICANT CHANGE UP (ref 0–0.9)
MONOCYTES NFR BLD AUTO: 7.9 % — SIGNIFICANT CHANGE UP (ref 2–14)
NEUTROPHILS # BLD AUTO: 7.7 K/UL — HIGH (ref 1.8–7.4)
NEUTROPHILS NFR BLD AUTO: 77 % — SIGNIFICANT CHANGE UP (ref 43–77)
NRBC # BLD: 0 /100 WBCS — SIGNIFICANT CHANGE UP (ref 0–0)
PLATELET # BLD AUTO: 185 K/UL — SIGNIFICANT CHANGE UP (ref 150–400)
POTASSIUM SERPL-MCNC: 4.4 MMOL/L — SIGNIFICANT CHANGE UP (ref 3.5–5.3)
POTASSIUM SERPL-SCNC: 4.4 MMOL/L — SIGNIFICANT CHANGE UP (ref 3.5–5.3)
PROCALCITONIN SERPL-MCNC: 0.07 NG/ML — SIGNIFICANT CHANGE UP (ref 0.02–0.1)
PROCALCITONIN SERPL-MCNC: 0.12 NG/ML — HIGH (ref 0.02–0.1)
RBC # BLD: 3.55 M/UL — LOW (ref 3.8–5.2)
RBC # FLD: 12.5 % — SIGNIFICANT CHANGE UP (ref 10.3–14.5)
SODIUM SERPL-SCNC: 139 MMOL/L — SIGNIFICANT CHANGE UP (ref 135–145)
SPECIMEN SOURCE: SIGNIFICANT CHANGE UP
TROPONIN I, HIGH SENSITIVITY RESULT: 8 NG/L — SIGNIFICANT CHANGE UP
TSH SERPL-MCNC: 2.91 UIU/ML — SIGNIFICANT CHANGE UP (ref 0.27–4.2)
WBC # BLD: 10 K/UL — SIGNIFICANT CHANGE UP (ref 3.8–10.5)
WBC # FLD AUTO: 10 K/UL — SIGNIFICANT CHANGE UP (ref 3.8–10.5)

## 2021-12-28 PROCEDURE — 99232 SBSQ HOSP IP/OBS MODERATE 35: CPT | Mod: GC

## 2021-12-28 PROCEDURE — 93306 TTE W/DOPPLER COMPLETE: CPT | Mod: 26

## 2021-12-28 RX ORDER — APIXABAN 2.5 MG/1
10 TABLET, FILM COATED ORAL EVERY 12 HOURS
Refills: 0 | Status: DISCONTINUED | OUTPATIENT
Start: 2021-12-28 | End: 2021-12-30

## 2021-12-28 RX ORDER — LEVOTHYROXINE SODIUM 125 MCG
1 TABLET ORAL
Qty: 0 | Refills: 0 | DISCHARGE

## 2021-12-28 RX ADMIN — SODIUM CHLORIDE 125 MILLILITER(S): 9 INJECTION, SOLUTION INTRAVENOUS at 07:22

## 2021-12-28 RX ADMIN — APIXABAN 10 MILLIGRAM(S): 2.5 TABLET, FILM COATED ORAL at 17:52

## 2021-12-28 RX ADMIN — SODIUM CHLORIDE 75 MILLILITER(S): 9 INJECTION, SOLUTION INTRAVENOUS at 09:48

## 2021-12-28 RX ADMIN — Medication 50 MICROGRAM(S): at 07:22

## 2021-12-28 RX ADMIN — SODIUM CHLORIDE 75 MILLILITER(S): 9 INJECTION, SOLUTION INTRAVENOUS at 21:22

## 2021-12-28 RX ADMIN — ENOXAPARIN SODIUM 70 MILLIGRAM(S): 100 INJECTION SUBCUTANEOUS at 07:03

## 2021-12-28 RX ADMIN — SODIUM CHLORIDE 125 MILLILITER(S): 9 INJECTION, SOLUTION INTRAVENOUS at 07:03

## 2021-12-28 NOTE — PROGRESS NOTE ADULT - PROBLEM SELECTOR PLAN 1
left popliteal, posterior tibial, and peroneal, provoked, about 2 weeks ago, was started on treatment dose LMWH   will switch to eliquis to 10 mg bid and transition to 5 mg bid  not a candidate for ct angio given severe allergy, and ct angio wont alter managment  d/c planning in am

## 2021-12-28 NOTE — CONSULT NOTE ADULT - ASSESSMENT
82 y/o F with PMH of DM type 2 currently off meds on diet control, Dyslipidemia, Asthma, and Hypothyroidism presented with left lower rib pain with cough. 80 y/o F with PMH of DM type 2 currently off meds on diet control, Dyslipidemia, Asthma, and Hypothyroidism presented with left lower rib pain with cough.    pt with DVT left  pt with most likely PE and Pulm Infarct on left side  CT chest non contrast noted, pt is allergic to Contrast - not a candidate for CTA chest  LE doppler noted  no need for VQ scan - tx for PE and DVT same - AC -   will need DOAC on DC   assess for RV strain - trop and ProBNP normal - HD stable - on RA -   dc home  pain rx regimen  follow up with [facility] as outpatient for Hypercoagulation work up and management  min of 3 months of AC - for VTE -   Asthma hx - Proventil PRN  seasonal vaccination  discussed above with pt

## 2021-12-28 NOTE — CONSULT NOTE ADULT - SUBJECTIVE AND OBJECTIVE BOX
Date/Time Patient Seen:  		  Referring MD:   Data Reviewed	       Patient is a 81y old  Female who presents with a chief complaint of Left sided rib pain. (27 Dec 2021 21:43)      Subjective/HPI  vs noted  labs reviewed  Imaging reviewed  left DVT  CT chest non contrast  H and P reviewed  ER provider note reviewed     History of Present Illness:   This is an 82 y/o F with PMH of DM type 2 currently off meds on diet control, Dyslipidemia, Asthma, and Hypothyroidism who presented with 10/10 left lower rib sharp stabbing pain radiating to her lower posterior chest wall, worsens by deep breathing & lying down, improves when sitting up or standing, associated with occasional wheezing, and cough productive of scanty thin clear non bloody sputum. Pain & cough were started 2 weeks ago, following a flight from [place], but was much more mild, became much worse about 6 days ago, so she saw her pulmonary who obtained a CXR, diagnosed her as a LLL PNA, gave her Ceftin that she took for 5 days with marked improvement, but today started to worsen again, so she called her pulmonary who recommended an ED visit. Patient reports low grade fever yesterday, no chills, T max 99.6 F, but normal for the whole day today, denies other flu-like symptoms, tested negative for COVID-19 last week, no history of any blood clotting problem in the past, denies any leg/calf pains, patient is allergic to IV dye.    FAMILY HISTORY:  Family history of diabetes mellitus  Family history of stroke    Father  Still living? Unknown  Family history of lung cancer, Age at diagnosis: Age Unknown.     Social History:  Social History (marital status, living situation, occupation, tobacco use, alcohol and drug use, and sexual history): -    , lives with , former smoker, no ETOH or drug abuse.     Tobacco Screening:  · Core Measure Site	No    Risk Assessment:    Present on Admission:  Deep Venous Thrombosis	yes  Pulmonary Embolus	suspected  Urinary Catheter	no  Central Venous Catheter/PICC Line	no  Surgical Site Incision	no  Pressure Ulcer(s)	no     Heart Failure:  Does this patient have a history of or has been diagnosed with heart failure? no.    PAST MEDICAL & SURGICAL HISTORY:  Diabetes    Hypothyroid    High cholesterol    Asthma    S/P oophorectomy          Medication list         MEDICATIONS  (STANDING):  atorvastatin 20 milliGRAM(s) Oral at bedtime  enoxaparin Injectable 70 milliGRAM(s) SubCutaneous every 12 hours  lactated ringers. 1000 milliLiter(s) (125 mL/Hr) IV Continuous <Continuous>  levothyroxine 50 MICROGram(s) Oral daily    MEDICATIONS  (PRN):  acetaminophen     Tablet .. 650 milliGRAM(s) Oral every 6 hours PRN Temp greater or equal to 38C (100.4F), Mild Pain (1 - 3)  ALBUTerol    90 MICROgram(s) HFA Inhaler 2 Puff(s) Inhalation every 6 hours PRN Shortness of Breath and/or Wheezing  morphine  - Injectable 2 milliGRAM(s) IV Push every 4 hours PRN Moderate Pain (4 - 6)  morphine  - Injectable 4 milliGRAM(s) IV Push every 4 hours PRN Severe Pain (7 - 10)         Vitals log        ICU Vital Signs Last 24 Hrs  T(C): 36.8 (28 Dec 2021 05:04), Max: 37 (27 Dec 2021 20:31)  T(F): 98.3 (28 Dec 2021 05:04), Max: 98.6 (27 Dec 2021 20:31)  HR: 73 (28 Dec 2021 05:04) (73 - 86)  BP: 112/70 (28 Dec 2021 05:04) (112/70 - 129/54)  BP(mean): 89 (27 Dec 2021 23:02) (89 - 89)  ABP: --  ABP(mean): --  RR: 18 (28 Dec 2021 05:04) (16 - 20)  SpO2: 95% (28 Dec 2021 05:04) (94% - 99%)           Input and Output:  I&O's Detail    27 Dec 2021 07:01  -  28 Dec 2021 06:49  --------------------------------------------------------  IN:    Lactated Ringers: 625 mL    Oral Fluid: 250 mL  Total IN: 875 mL    OUT:  Total OUT: 0 mL    Total NET: 875 mL          Lab Data                        13.0   13.80 )-----------( 207      ( 27 Dec 2021 16:46 )             40.0     12-27    137  |  101  |  23  ----------------------------<  110<H>  4.3   |  27  |  0.86    Ca    8.8      27 Dec 2021 16:46    TPro  7.2  /  Alb  3.0<L>  /  TBili  0.3  /  DBili  x   /  AST  17  /  ALT  24  /  AlkPhos  100  12-27            Review of Systems	  CP  weakness  anxious      Objective     Physical Examination        Pertinent Lab findings & Imaging      Ramon:  NO   Adequate UO     I&O's Detail    27 Dec 2021 07:01  -  28 Dec 2021 06:49  --------------------------------------------------------  IN:    Lactated Ringers: 625 mL    Oral Fluid: 250 mL  Total IN: 875 mL    OUT:  Total OUT: 0 mL    Total NET: 875 mL               Discussed with:     Cultures:	        Radiology    ACC: [id number] EXAM:  CT CHEST                          PROCEDURE DATE:  12/27/2021          INTERPRETATION:  CLINICAL INDICATION: Left rib pain.    Axial CT images of the chest are obtained without intravenous   administration of contrast.    No prior chest CTs are available for comparison.    No enlarged axillary or mediastinal lymph nodes. Multiple small   subcentimeter mediastinal and hilar lymph nodes. Right hilar partially   calcified lymph nodes are suggestive of prior granulomatous infection.    Heart size is normal. No pericardial effusion. Vascular calcifications   with involvement of the aorta and the coronary arteries.    Trace left pleural effusion.    Evaluation of the upper abdomen demonstrate partially imaged hepatic cyst.    Evaluation of the lungs demonstrate left lower lung areas of linear or   subsegmental atelectasis. Adjacent areas of groundglass with an adjacent   ill-defined left lung base nodular component at the left costophrenic   angle on image 103 of series 3 measuring about 2.7 cm. Minimal right lung   base linear atelectasis. Right lower lobe calcified granuloma.    No central endobronchial lesions.    Degenerative changes of the spine.    IMPRESSION: Trace left pleural effusion with left lower lung areas of   atelectasis.    Adjacent groundglass within the left lower lobe at the left lung base   with a nodular component at the left costophrenic angle can be seen in   the setting of pulmonary infarction. CT pulmonary angiogram is   recommended for complete evaluation.    --- End of Report ---            RICHIE LOPEZ MD; Attending Radiologist  This document has been electronically signed. Dec 27 2021  8:25PM        ACC: [id number] EXAM:  US DPLX LWR EXT VEINS COMPL BI                          PROCEDURE DATE:  12/27/2021          INTERPRETATION:  CLINICAL INFORMATION:    COMPARISON: None available.    TECHNIQUE: Duplex sonography of the BILATERAL LOWER extremity veins with   color and spectral Doppler, with and without compression.    FINDINGS:    RIGHT:  Normal compressibility of the RIGHT common femoral, femoral and popliteal   veins.  Doppler examination shows normal spontaneous and phasic flow.  No RIGHT calf vein thrombosis is detected.    LEFT:  Normal compressibility of the LEFT common femoral, and femoral veins.   Intraluminal thrombus is present in the popliteal vein, posterior tibial   vein and peroneal veins with incomplete compressibility.    IMPRESSION:  DVT left popliteal, posterior tibial and peroneal veins.    No right leg DVT.      Findings were discussed with Dr. MOLLY FONTENOT [id number] 12/27/2021   6:46 PM by Dr. Petra Feliz with read back confirmation.    --- End of Report ---            PETRA FELIZ MD; Attending Radiologist  This document has been electronically signed. Dec 27 2021  6:48PM                     Date/Time Patient Seen:  		  Referring MD:   Data Reviewed	       Patient is a 81y old  Female who presents with a chief complaint of Left sided rib pain. (27 Dec 2021 21:43)      Subjective/HPI  vs noted  labs reviewed  Imaging reviewed  left DVT  CT chest non contrast  H and P reviewed  ER provider note reviewed     History of Present Illness:   This is an 82 y/o F with PMH of DM type 2 currently off meds on diet control, Dyslipidemia, Asthma, and Hypothyroidism who presented with 10/10 left lower rib sharp stabbing pain radiating to her lower posterior chest wall, worsens by deep breathing & lying down, improves when sitting up or standing, associated with occasional wheezing, and cough productive of scanty thin clear non bloody sputum. Pain & cough were started 2 weeks ago, following a flight from [place], but was much more mild, became much worse about 6 days ago, so she saw her pulmonary who obtained a CXR, diagnosed her as a LLL PNA, gave her Ceftin that she took for 5 days with marked improvement, but today started to worsen again, so she called her pulmonary who recommended an ED visit. Patient reports low grade fever yesterday, no chills, T max 99.6 F, but normal for the whole day today, denies other flu-like symptoms, tested negative for COVID-19 last week, no history of any blood clotting problem in the past, denies any leg/calf pains, patient is allergic to IV dye.    FAMILY HISTORY:  Family history of diabetes mellitus  Family history of stroke    Father  Still living? Unknown  Family history of lung cancer, Age at diagnosis: Age Unknown.     Social History:  Social History (marital status, living situation, occupation, tobacco use, alcohol and drug use, and sexual history): -    , lives with , former smoker, no ETOH or drug abuse.     Tobacco Screening:  · Core Measure Site	No    Risk Assessment:    Present on Admission:  Deep Venous Thrombosis	yes  Pulmonary Embolus	suspected  Urinary Catheter	no  Central Venous Catheter/PICC Line	no  Surgical Site Incision	no  Pressure Ulcer(s)	no     Heart Failure:  Does this patient have a history of or has been diagnosed with heart failure? no.    PAST MEDICAL & SURGICAL HISTORY:  Diabetes    Hypothyroid    High cholesterol    Asthma    S/P oophorectomy          Medication list         MEDICATIONS  (STANDING):  atorvastatin 20 milliGRAM(s) Oral at bedtime  enoxaparin Injectable 70 milliGRAM(s) SubCutaneous every 12 hours  lactated ringers. 1000 milliLiter(s) (125 mL/Hr) IV Continuous <Continuous>  levothyroxine 50 MICROGram(s) Oral daily    MEDICATIONS  (PRN):  acetaminophen     Tablet .. 650 milliGRAM(s) Oral every 6 hours PRN Temp greater or equal to 38C (100.4F), Mild Pain (1 - 3)  ALBUTerol    90 MICROgram(s) HFA Inhaler 2 Puff(s) Inhalation every 6 hours PRN Shortness of Breath and/or Wheezing  morphine  - Injectable 2 milliGRAM(s) IV Push every 4 hours PRN Moderate Pain (4 - 6)  morphine  - Injectable 4 milliGRAM(s) IV Push every 4 hours PRN Severe Pain (7 - 10)         Vitals log        ICU Vital Signs Last 24 Hrs  T(C): 36.8 (28 Dec 2021 05:04), Max: 37 (27 Dec 2021 20:31)  T(F): 98.3 (28 Dec 2021 05:04), Max: 98.6 (27 Dec 2021 20:31)  HR: 73 (28 Dec 2021 05:04) (73 - 86)  BP: 112/70 (28 Dec 2021 05:04) (112/70 - 129/54)  BP(mean): 89 (27 Dec 2021 23:02) (89 - 89)  ABP: --  ABP(mean): --  RR: 18 (28 Dec 2021 05:04) (16 - 20)  SpO2: 95% (28 Dec 2021 05:04) (94% - 99%)           Input and Output:  I&O's Detail    27 Dec 2021 07:01  -  28 Dec 2021 06:49  --------------------------------------------------------  IN:    Lactated Ringers: 625 mL    Oral Fluid: 250 mL  Total IN: 875 mL    OUT:  Total OUT: 0 mL    Total NET: 875 mL          Lab Data                        13.0   13.80 )-----------( 207      ( 27 Dec 2021 16:46 )             40.0     12-27    137  |  101  |  23  ----------------------------<  110<H>  4.3   |  27  |  0.86    Ca    8.8      27 Dec 2021 16:46    TPro  7.2  /  Alb  3.0<L>  /  TBili  0.3  /  DBili  x   /  AST  17  /  ALT  24  /  AlkPhos  100  12-27            Review of Systems	  CP  weakness  anxious      Objective     Physical Examination  on RA  heart s1s2  lung dec BS  abd soft  head nc  verbal  alert  hard of hearing        Pertinent Lab findings & Imaging      Ramon:  NO   Adequate UO     I&O's Detail    27 Dec 2021 07:01  -  28 Dec 2021 06:49  --------------------------------------------------------  IN:    Lactated Ringers: 625 mL    Oral Fluid: 250 mL  Total IN: 875 mL    OUT:  Total OUT: 0 mL    Total NET: 875 mL               Discussed with:     Cultures:	        Radiology    ACC: [id number] EXAM:  CT CHEST                          PROCEDURE DATE:  12/27/2021          INTERPRETATION:  CLINICAL INDICATION: Left rib pain.    Axial CT images of the chest are obtained without intravenous   administration of contrast.    No prior chest CTs are available for comparison.    No enlarged axillary or mediastinal lymph nodes. Multiple small   subcentimeter mediastinal and hilar lymph nodes. Right hilar partially   calcified lymph nodes are suggestive of prior granulomatous infection.    Heart size is normal. No pericardial effusion. Vascular calcifications   with involvement of the aorta and the coronary arteries.    Trace left pleural effusion.    Evaluation of the upper abdomen demonstrate partially imaged hepatic cyst.    Evaluation of the lungs demonstrate left lower lung areas of linear or   subsegmental atelectasis. Adjacent areas of groundglass with an adjacent   ill-defined left lung base nodular component at the left costophrenic   angle on image 103 of series 3 measuring about 2.7 cm. Minimal right lung   base linear atelectasis. Right lower lobe calcified granuloma.    No central endobronchial lesions.    Degenerative changes of the spine.    IMPRESSION: Trace left pleural effusion with left lower lung areas of   atelectasis.    Adjacent groundglass within the left lower lobe at the left lung base   with a nodular component at the left costophrenic angle can be seen in   the setting of pulmonary infarction. CT pulmonary angiogram is   recommended for complete evaluation.    --- End of Report ---            RICHIE LOPEZ MD; Attending Radiologist  This document has been electronically signed. Dec 27 2021  8:25PM        ACC: [id number] EXAM:  US DPLX LWR EXT VEINS COMPL BI                          PROCEDURE DATE:  12/27/2021          INTERPRETATION:  CLINICAL INFORMATION:    COMPARISON: None available.    TECHNIQUE: Duplex sonography of the BILATERAL LOWER extremity veins with   color and spectral Doppler, with and without compression.    FINDINGS:    RIGHT:  Normal compressibility of the RIGHT common femoral, femoral and popliteal   veins.  Doppler examination shows normal spontaneous and phasic flow.  No RIGHT calf vein thrombosis is detected.    LEFT:  Normal compressibility of the LEFT common femoral, and femoral veins.   Intraluminal thrombus is present in the popliteal vein, posterior tibial   vein and peroneal veins with incomplete compressibility.    IMPRESSION:  DVT left popliteal, posterior tibial and peroneal veins.    No right leg DVT.      Findings were discussed with Dr. MOLLY FONTENOT [phone] 12/27/2021   6:46 PM by Dr. Petra Feliz with read back confirmation.    --- End of Report ---            PETRA FELIZ MD; Attending Radiologist  This document has been electronically signed. Dec 27 2021  6:48PM

## 2021-12-28 NOTE — PROGRESS NOTE ADULT - SUBJECTIVE AND OBJECTIVE BOX
Patient is a 81y old  Female who presents with a chief complaint of Left sided rib pain. (28 Dec 2021 06:48)        HPI:  This is an 80 y/o F with PMH of DM type 2 currently off meds on diet control, Dyslipidemia, Asthma, and Hypothyroidism who presented with 10/10 left lower rib sharp stabbing pain radiating to her lower posterior chest wall, worsens by deep breathing & lying down, improves when sitting up or standing, associated with occasional wheezing, and cough productive of scanty thin clear non bloody sputum. Pain & cough were started 2 weeks ago, following a flight from [place], but was much more mild, became much worse about 6 days ago, so she saw her pulmonary who obtained a CXR, diagnosed her as a LLL PNA, gave her Ceftin that she took for 5 days with marked improvement, but today started to worsen again, so she called her pulmonary who recommended an ED visit. Patient reports low grade fever yesterday, no chills, T max 99.6 F, but normal for the whole day today, denies other flu-like symptoms, tested negative for COVID-19 last week, no history of any blood clotting problem in the past, denies any leg/calf pains, patient is allergic to IV dye. (27 Dec 2021 21:43)      SUBJECTIVE & OBJECTIVE: Pt seen and examined at bedside. nad    PHYSICAL EXAM:  T(C): 36.6 (21 @ 11:19), Max: 37 (21 @ 20:31)  HR: 76 (21 @ 11:19) (73 - 86)  BP: 148/77 (21 @ 11:19) (112/70 - 148/77)  RR: 17 (21 @ 11:19) (16 - 20)  SpO2: 95% (21 @ 11:19) (94% - 99%)  Wt(kg): -- Height (cm): 154.9 ( @ 23:02)  Weight (kg): 67.5 ( @ 23:02)  BMI (kg/m2): 28.1 ( @ 23:02)  BSA (m2): 1.67 ( @ 23:02)  GENERAL: NAD, well-groomed, well-developed  HEAD:  Atraumatic, Normocephalic  EYES: EOMI, PERRLA, conjunctiva and sclera clear  ENMT: Moist mucous membranes  NECK: Supple, No JVD  NERVOUS SYSTEM:  Alert & Oriented X3, Motor Strength 5/5 B/L upper and lower extremities; DTRs 2+ intact and symmetric  CHEST/LUNG: Clear to auscultation bilaterally; No rales, rhonchi, wheezing, or rubs  HEART: Regular rate and rhythm; No murmurs, rubs, or gallops  ABDOMEN: Soft, Nontender, Nondistended; Bowel sounds present  EXTREMITIES:  2+ Peripheral Pulses, No clubbing, cyanosis, or edema        MEDICATIONS  (STANDING):  atorvastatin 20 milliGRAM(s) Oral at bedtime  enoxaparin Injectable 70 milliGRAM(s) SubCutaneous every 12 hours  lactated ringers. 1000 milliLiter(s) (75 mL/Hr) IV Continuous <Continuous>  levothyroxine 50 MICROGram(s) Oral daily    MEDICATIONS  (PRN):  acetaminophen     Tablet .. 650 milliGRAM(s) Oral every 6 hours PRN Temp greater or equal to 38C (100.4F), Mild Pain (1 - 3)  ALBUTerol    90 MICROgram(s) HFA Inhaler 2 Puff(s) Inhalation every 6 hours PRN Shortness of Breath and/or Wheezing  morphine  - Injectable 2 milliGRAM(s) IV Push every 4 hours PRN Moderate Pain (4 - 6)  morphine  - Injectable 4 milliGRAM(s) IV Push every 4 hours PRN Severe Pain (7 - 10)      LABS:                        10.5   10.00 )-----------( 185      ( 28 Dec 2021 07:50 )             32.5     12-    139  |  106  |  16  ----------------------------<  107<H>  4.4   |  25  |  0.90    Ca    8.1<L>      28 Dec 2021 07:50    TPro  7.2  /  Alb  3.0<L>  /  TBili  0.3  /  DBili  x   /  AST  17  /  ALT  24  /  AlkPhos  100  12-27    PT/INR - ( 27 Dec 2021 16:46 )   PT: 14.3 sec;   INR: 1.19 ratio         PTT - ( 27 Dec 2021 16:46 )  PTT:33.9 sec  Urinalysis Basic - ( 27 Dec 2021 16:47 )    Color: Yellow / Appearance: Clear / S.010 / pH: x  Gluc: x / Ketone: Negative  / Bili: Negative / Urobili: Negative mg/dL   Blood: x / Protein: 15 mg/dL / Nitrite: Negative   Leuk Esterase: Negative / RBC: 0-2 /HPF / WBC 0-2   Sq Epi: x / Non Sq Epi: Occasional / Bacteria: x        CAPILLARY BLOOD GLUCOSE      POCT Blood Glucose.: 111 mg/dL (28 Dec 2021 11:52)  POCT Blood Glucose.: 118 mg/dL (28 Dec 2021 08:11)  POCT Blood Glucose.: 89 mg/dL (27 Dec 2021 17:13)      CAPILLARY BLOOD GLUCOSE      POCT Blood Glucose.: 111 mg/dL (28 Dec 2021 11:52)  POCT Blood Glucose.: 118 mg/dL (28 Dec 2021 08:11)  POCT Blood Glucose.: 89 mg/dL (27 Dec 2021 17:13)    CAPILLARY BLOOD GLUCOSE      POCT Blood Glucose.: 111 mg/dL (28 Dec 2021 11:52)            RECENT CULTURES:      RADIOLOGY & ADDITIONAL TESTS:                        DVT/GI ppx  Discussed with pt @ bedside

## 2021-12-29 ENCOUNTER — TRANSCRIPTION ENCOUNTER (OUTPATIENT)
Age: 81
End: 2021-12-29

## 2021-12-29 LAB
ALBUMIN SERPL ELPH-MCNC: 2.3 G/DL — LOW (ref 3.3–5)
ALP SERPL-CCNC: 85 U/L — SIGNIFICANT CHANGE UP (ref 30–120)
ALT FLD-CCNC: 33 U/L DA — SIGNIFICANT CHANGE UP (ref 10–60)
ANION GAP SERPL CALC-SCNC: 8 MMOL/L — SIGNIFICANT CHANGE UP (ref 5–17)
AST SERPL-CCNC: 23 U/L — SIGNIFICANT CHANGE UP (ref 10–40)
BILIRUB SERPL-MCNC: 0.3 MG/DL — SIGNIFICANT CHANGE UP (ref 0.2–1.2)
BUN SERPL-MCNC: 15 MG/DL — SIGNIFICANT CHANGE UP (ref 7–23)
CALCIUM SERPL-MCNC: 8.5 MG/DL — SIGNIFICANT CHANGE UP (ref 8.4–10.5)
CHLORIDE SERPL-SCNC: 106 MMOL/L — SIGNIFICANT CHANGE UP (ref 96–108)
CO2 SERPL-SCNC: 27 MMOL/L — SIGNIFICANT CHANGE UP (ref 22–31)
CREAT SERPL-MCNC: 0.88 MG/DL — SIGNIFICANT CHANGE UP (ref 0.5–1.3)
GLUCOSE SERPL-MCNC: 108 MG/DL — HIGH (ref 70–99)
HCT VFR BLD CALC: 32 % — LOW (ref 34.5–45)
HGB BLD-MCNC: 10.4 G/DL — LOW (ref 11.5–15.5)
MCHC RBC-ENTMCNC: 29.6 PG — SIGNIFICANT CHANGE UP (ref 27–34)
MCHC RBC-ENTMCNC: 32.5 GM/DL — SIGNIFICANT CHANGE UP (ref 32–36)
MCV RBC AUTO: 91.2 FL — SIGNIFICANT CHANGE UP (ref 80–100)
NRBC # BLD: 0 /100 WBCS — SIGNIFICANT CHANGE UP (ref 0–0)
PLATELET # BLD AUTO: 223 K/UL — SIGNIFICANT CHANGE UP (ref 150–400)
POTASSIUM SERPL-MCNC: 3.9 MMOL/L — SIGNIFICANT CHANGE UP (ref 3.5–5.3)
POTASSIUM SERPL-SCNC: 3.9 MMOL/L — SIGNIFICANT CHANGE UP (ref 3.5–5.3)
PROT SERPL-MCNC: 6 G/DL — SIGNIFICANT CHANGE UP (ref 6–8.3)
RBC # BLD: 3.51 M/UL — LOW (ref 3.8–5.2)
RBC # FLD: 12.3 % — SIGNIFICANT CHANGE UP (ref 10.3–14.5)
SODIUM SERPL-SCNC: 141 MMOL/L — SIGNIFICANT CHANGE UP (ref 135–145)
WBC # BLD: 8.18 K/UL — SIGNIFICANT CHANGE UP (ref 3.8–10.5)
WBC # FLD AUTO: 8.18 K/UL — SIGNIFICANT CHANGE UP (ref 3.8–10.5)

## 2021-12-29 PROCEDURE — 99232 SBSQ HOSP IP/OBS MODERATE 35: CPT

## 2021-12-29 RX ORDER — PETROLATUM,WHITE
1 JELLY (GRAM) TOPICAL ONCE
Refills: 0 | Status: COMPLETED | OUTPATIENT
Start: 2021-12-29 | End: 2021-12-29

## 2021-12-29 RX ORDER — ONDANSETRON 8 MG/1
4 TABLET, FILM COATED ORAL ONCE
Refills: 0 | Status: COMPLETED | OUTPATIENT
Start: 2021-12-29 | End: 2021-12-29

## 2021-12-29 RX ORDER — APIXABAN 2.5 MG/1
2 TABLET, FILM COATED ORAL
Qty: 120 | Refills: 0
Start: 2021-12-29 | End: 2022-01-27

## 2021-12-29 RX ORDER — POLYETHYLENE GLYCOL 3350 17 G/17G
17 POWDER, FOR SOLUTION ORAL
Qty: 0 | Refills: 0 | DISCHARGE
Start: 2021-12-29

## 2021-12-29 RX ORDER — POLYETHYLENE GLYCOL 3350 17 G/17G
17 POWDER, FOR SOLUTION ORAL ONCE
Refills: 0 | Status: COMPLETED | OUTPATIENT
Start: 2021-12-29 | End: 2021-12-29

## 2021-12-29 RX ORDER — ACETAMINOPHEN 500 MG
2 TABLET ORAL
Qty: 0 | Refills: 0 | DISCHARGE
Start: 2021-12-29

## 2021-12-29 RX ORDER — POLYETHYLENE GLYCOL 3350 17 G/17G
17 POWDER, FOR SOLUTION ORAL DAILY
Refills: 0 | Status: DISCONTINUED | OUTPATIENT
Start: 2021-12-29 | End: 2021-12-30

## 2021-12-29 RX ORDER — SENNA PLUS 8.6 MG/1
2 TABLET ORAL DAILY
Refills: 0 | Status: DISCONTINUED | OUTPATIENT
Start: 2021-12-29 | End: 2021-12-30

## 2021-12-29 RX ADMIN — Medication 100 MILLIGRAM(S): at 10:23

## 2021-12-29 RX ADMIN — Medication 100 MILLIGRAM(S): at 21:44

## 2021-12-29 RX ADMIN — Medication 1 APPLICATION(S): at 14:34

## 2021-12-29 RX ADMIN — Medication 50 MICROGRAM(S): at 06:47

## 2021-12-29 RX ADMIN — POLYETHYLENE GLYCOL 3350 17 GRAM(S): 17 POWDER, FOR SOLUTION ORAL at 18:02

## 2021-12-29 RX ADMIN — SENNA PLUS 2 TABLET(S): 8.6 TABLET ORAL at 21:51

## 2021-12-29 RX ADMIN — APIXABAN 10 MILLIGRAM(S): 2.5 TABLET, FILM COATED ORAL at 18:02

## 2021-12-29 RX ADMIN — ONDANSETRON 4 MILLIGRAM(S): 8 TABLET, FILM COATED ORAL at 12:54

## 2021-12-29 RX ADMIN — POLYETHYLENE GLYCOL 3350 17 GRAM(S): 17 POWDER, FOR SOLUTION ORAL at 10:23

## 2021-12-29 RX ADMIN — Medication 100 MILLIGRAM(S): at 14:33

## 2021-12-29 RX ADMIN — APIXABAN 10 MILLIGRAM(S): 2.5 TABLET, FILM COATED ORAL at 06:47

## 2021-12-29 NOTE — PROGRESS NOTE ADULT - PROBLEM SELECTOR PLAN 7
continue Levothyroxine 50 mcg PO daily, check TSH in am. continue Levothyroxine 50 mcg PO daily, TSH 2.91

## 2021-12-29 NOTE — PROGRESS NOTE ADULT - PROBLEM SELECTOR PLAN 2
CT without contrast & presenting complaint both suggestive of a pulmonary infarct, more likely in the setting of acute DVT, patient is allergic to IV contrast, VQ scan was ordered by ED team, hemodynamically stable, no change in management, admitted to telemetry with continuous SPO2, pain control, in addition to the above anticoagulation, TTE in am for RV evaluation, patient received Azithromycin IVPB & Ceftriaxone one dose each by ED team, will hold off antibiotics as PNA is unlikely given the scenario & imaging results, check procalcitonin, trend TLC & clinical progress, discussed with patient, pulmonary consult with Dr. Underwood was called. CT without contrast & presenting complaint both suggestive of a pulmonary infarct, more likely in the setting of acute DVT, patient is allergic to IV contrast. No need for VQ scan as per pulm, hemodynamically stable, no change in management.  TTE grossly normal, mild DD  patient received Azithromycin IVPB & Ceftriaxone one dose each by ED team, hold off antibiotics as PNA is unlikely given the scenario & imaging results, procalcitonin 0.07  troponin trend noted: <4, 8 - in setting of suspected PE  pulmonary consult with Dr. Underwood, alejandro noted

## 2021-12-29 NOTE — PROGRESS NOTE ADULT - PROBLEM SELECTOR PLAN 1
left popliteal, posterior tibial, and peroneal, provoked, about 2 weeks ago, was started on treatment dose LMWH   will switch to eliquis to 10 mg bid and transition to 5 mg bid  not a candidate for ct angio given severe allergy, and ct angio wont alter managment  d/c planning in am left popliteal, posterior tibial, and peroneal, provoked, about 2 weeks ago, was started on treatment dose LMWH  Switched to Eliquis to 10 mg bid and transition to 5 mg bid  Not a candidate for ct angio given severe allergy, and ct angio wont alter managment  d/c planning in AM as patient feeling nauseous and uncomfortable with discharge today. Nausea in setting of constipation, ordered for Miralax

## 2021-12-29 NOTE — PROGRESS NOTE ADULT - PROBLEM SELECTOR PLAN 4
currently off Metformin & on successful diet control as per patient, random venous plasma glucose today within normal, so was cap glucose check, consistent carbohydrate diet without snacks, POC glucose before meals & at bedtime. currently off Metformin & on successful diet control as per patient  consistent carbohydrate diet without snacks, POC glucose before meals & at bedtime.

## 2021-12-29 NOTE — DISCHARGE NOTE PROVIDER - CARE PROVIDER_API CALL
Phoenix Lucas  HEMATOLOGY  [address], [address]  [place] [address]  Phone: [phone]  Fax: [phone]  Follow Up Time: 2 weeks    Glen [place]ers)  Critical Care Medicine; Internal Medicine; Pulmonary Disease  [address], [address]  [place] [address]  Phone: [phone]  Fax: [phone]  Follow Up Time:

## 2021-12-29 NOTE — PROGRESS NOTE ADULT - PROBLEM SELECTOR PLAN 3
with neutrophilia, ML related to the above, presence of an infection process is unlikely as stated above, patient just finished 5 days of Ceftin as an outpatient, trend TLC & temp off antibiotics, f/u cultures obtained earlier by ED team, procalcitonin level check. resolved, likely reactive  presence of an infection process is unlikely as stated above, patient just finished 5 days of Ceftin as an outpatient  UCx with no growth, BCx NGTD

## 2021-12-29 NOTE — PROGRESS NOTE ADULT - PROBLEM SELECTOR PLAN 5
on Simvastatin 40 mg at bedtime which is inappropriate, changed to an equivalent dose of Atorvastatin. on Simvastatin 40 mg at bedtime changed to an equivalent dose of Atorvastatin

## 2021-12-29 NOTE — DISCHARGE NOTE PROVIDER - NSDCCPCAREPLAN_GEN_ALL_CORE_FT
PRINCIPAL DISCHARGE DIAGNOSIS  Diagnosis: Acute chest pain  Assessment and Plan of Treatment: You were admitted for DVT of the left leg and likely pulmonary embolism.   - Continue your blood thinner Eliquis twice a day as directed.  - Please schedule a follow up appointment with a hematologist for refilling your Eliquis and hypercoagulation work up. The contact information for Dr. Lucas has been provided, but you may also schedule an appointment with a different hematologist.      SECONDARY DISCHARGE DIAGNOSES  Diagnosis: DVT, lower extremity  Assessment and Plan of Treatment:      PRINCIPAL DISCHARGE DIAGNOSIS  Diagnosis: Acute chest pain  Assessment and Plan of Treatment: You were admitted for DVT of the left leg and likely pulmonary embolism.   - Continue your blood thinner Eliquis twice a day as directed.  - Please schedule a follow up appointment with a hematologist for refilling your Eliquis and hypercoagulation work up. The contact information for Dr. Lucas has been provided, but you may also schedule an appointment with a different hematologist.      SECONDARY DISCHARGE DIAGNOSES  Diagnosis: Constipation  Assessment and Plan of Treatment: Continue to eat a well-balanced diet and stay adequately hydrated.  You may take Miralax, Senna and Colace as needed for constipation. You may also use a Dulcolax suppository as needed if these do not work.    Diagnosis: DVT, lower extremity  Assessment and Plan of Treatment:      PRINCIPAL DISCHARGE DIAGNOSIS  Diagnosis: Acute chest pain  Assessment and Plan of Treatment: You were admitted for DVT of the left leg and likely pulmonary embolism.   - Continue your blood thinner Eliquis twice a day as directed.  - Please schedule a follow up appointment with a hematologist for refilling your Eliquis and hypercoagulation work up. The contact information for Dr. Lucas has been provided, but you may also schedule an appointment with a different hematologist.      SECONDARY DISCHARGE DIAGNOSES  Diagnosis: Constipation  Assessment and Plan of Treatment: Continue to eat a well-balanced diet and stay adequately hydrated.  - You may take Miralax, Senna and Colace as needed for constipation. You may also use a Dulcolax suppository as needed if these do not work.  - You may take a Simethicone (Gas-X) as needed for gas.    Diagnosis: DVT, lower extremity  Assessment and Plan of Treatment:

## 2021-12-29 NOTE — PROGRESS NOTE ADULT - PROBLEM SELECTOR PLAN 8
already started on treatment dose LMWH in transition to a DOAC, no need for further VTE prophylaxis. Now on Eliquis

## 2021-12-29 NOTE — PROGRESS NOTE ADULT - SUBJECTIVE AND OBJECTIVE BOX
Date/Time Patient Seen:  		  Referring MD:   Data Reviewed	       Patient is a 81y old  Female who presents with a chief complaint of Left sided rib pain. (28 Dec 2021 13:25)      Subjective/HPI     PAST MEDICAL & SURGICAL HISTORY:  Diabetes    Hypothyroid    High cholesterol    Asthma    S/P oophorectomy          Medication list         MEDICATIONS  (STANDING):  apixaban 10 milliGRAM(s) Oral every 12 hours  atorvastatin 20 milliGRAM(s) Oral at bedtime  lactated ringers. 1000 milliLiter(s) (75 mL/Hr) IV Continuous <Continuous>  levothyroxine 50 MICROGram(s) Oral daily    MEDICATIONS  (PRN):  acetaminophen     Tablet .. 650 milliGRAM(s) Oral every 6 hours PRN Temp greater or equal to 38C (100.4F), Mild Pain (1 - 3)  ALBUTerol    90 MICROgram(s) HFA Inhaler 2 Puff(s) Inhalation every 6 hours PRN Shortness of Breath and/or Wheezing  morphine  - Injectable 2 milliGRAM(s) IV Push every 4 hours PRN Moderate Pain (4 - 6)  morphine  - Injectable 4 milliGRAM(s) IV Push every 4 hours PRN Severe Pain (7 - 10)         Vitals log        ICU Vital Signs Last 24 Hrs  T(C): 36.6 (29 Dec 2021 02:05), Max: 36.9 (28 Dec 2021 17:33)  T(F): 97.8 (29 Dec 2021 02:05), Max: 98.5 (28 Dec 2021 17:33)  HR: 67 (29 Dec 2021 02:05) (67 - 88)  BP: 143/73 (29 Dec 2021 02:05) (124/63 - 151/77)  BP(mean): --  ABP: --  ABP(mean): --  RR: 16 (29 Dec 2021 02:05) (15 - 18)  SpO2: 94% (29 Dec 2021 02:05) (94% - 97%)           Input and Output:  I&O's Detail    27 Dec 2021 07:01  -  28 Dec 2021 07:00  --------------------------------------------------------  IN:    Lactated Ringers: 1000 mL    Oral Fluid: 400 mL  Total IN: 1400 mL    OUT:  Total OUT: 0 mL    Total NET: 1400 mL      28 Dec 2021 07:01  -  29 Dec 2021 06:45  --------------------------------------------------------  IN:    Lactated Ringers: 600 mL  Total IN: 600 mL    OUT:  Total OUT: 0 mL    Total NET: 600 mL          Lab Data                        10.5   10.00 )-----------( 185      ( 28 Dec 2021 07:50 )             32.5     12-28    139  |  106  |  16  ----------------------------<  107<H>  4.4   |  25  |  0.90    Ca    8.1<L>      28 Dec 2021 07:50    TPro  7.2  /  Alb  3.0<L>  /  TBili  0.3  /  DBili  x   /  AST  17  /  ALT  24  /  AlkPhos  100  12-27            Review of Systems	      Objective     Physical Examination  heart s1s2  lung dec BS  abd soft  head nc        Pertinent Lab findings & Imaging      Yenny:  NO   Adequate UO     I&O's Detail    27 Dec 2021 07:01  -  28 Dec 2021 07:00  --------------------------------------------------------  IN:    Lactated Ringers: 1000 mL    Oral Fluid: 400 mL  Total IN: 1400 mL    OUT:  Total OUT: 0 mL    Total NET: 1400 mL      28 Dec 2021 07:01  -  29 Dec 2021 06:45  --------------------------------------------------------  IN:    Lactated Ringers: 600 mL  Total IN: 600 mL    OUT:  Total OUT: 0 mL    Total NET: 600 mL               Discussed with:     Cultures:	        Radiology

## 2021-12-29 NOTE — DISCHARGE NOTE PROVIDER - NSDCMRMEDTOKEN_GEN_ALL_CORE_FT
acetaminophen 325 mg oral tablet: 2 tab(s) orally every 6 hours, As needed, Temp greater or equal to 38C (100.4F), Mild Pain (1 - 3)  apixaban 5 mg oral tablet: 2 tabs orally every 12 hours through 1/3/21  Then 1 tab orally every 12 hours starting 1/4/21  polyethylene glycol 3350 oral powder for reconstitution: 17 gram(s) orally once a day as needed for constipation, over the counter  Synthroid 75 mcg (0.075 mg) oral tablet: 1 tab(s) orally once a day  Tessalon Perles 100 mg oral capsule: 1 cap(s) orally 3 times a day, As Needed for cough   Ventolin HFA 90 mcg/inh inhalation aerosol: 2 puff(s) inhaled 4 times a day, As Needed  Zocor 40 mg oral tablet: 1 tab(s) orally once a day (at bedtime)   acetaminophen 325 mg oral tablet: 2 tab(s) orally every 6 hours, As needed, Temp greater or equal to 38C (100.4F), Mild Pain (1 - 3)  apixaban 5 mg oral tablet: 2 tabs orally every 12 hours through 1/3/21  Then 1 tab orally every 12 hours starting 1/4/21  polyethylene glycol 3350 oral powder for reconstitution: 17 gram(s) orally once a day as needed for constipation, over the counter  senna oral tablet: 2 tab(s) orally once a day as needed for constipation, over the counter  Synthroid 75 mcg (0.075 mg) oral tablet: 1 tab(s) orally once a day  Tessalon Perles 100 mg oral capsule: 1 cap(s) orally 3 times a day, As Needed for cough   Ventolin HFA 90 mcg/inh inhalation aerosol: 2 puff(s) inhaled 4 times a day, As Needed  Zocor 40 mg oral tablet: 1 tab(s) orally once a day (at bedtime)   acetaminophen 325 mg oral tablet: 2 tab(s) orally every 6 hours, As needed, Temp greater or equal to 38C (100.4F), Mild Pain (1 - 3)  apixaban 5 mg oral tablet: 2 tabs orally every 12 hours through 1/3/21  Then 1 tab orally every 12 hours starting 1/4/21  polyethylene glycol 3350 oral powder for reconstitution: Miralax 17 gram(s) orally once a day as needed for constipation, over the counter  senna oral tablet: 2 tab(s) orally once a day as needed for constipation, over the counter  Synthroid 75 mcg (0.075 mg) oral tablet: 1 tab(s) orally once a day  Tessalon Perles 100 mg oral capsule: 1 cap(s) orally 3 times a day, As Needed for cough   Ventolin HFA 90 mcg/inh inhalation aerosol: 2 puff(s) inhaled 4 times a day, As Needed  Zocor 40 mg oral tablet: 1 tab(s) orally once a day (at bedtime)

## 2021-12-29 NOTE — DISCHARGE NOTE PROVIDER - ATTENDING DISCHARGE PHYSICAL EXAMINATION:
T(C): 36.5 (12-29-21 @ 10:00), Max: 36.9 (12-28-21 @ 17:33)  HR: 75 (12-29-21 @ 10:00) (60 - 88)  BP: 142/79 (12-29-21 @ 10:00) (124/63 - 151/77)  RR: 17 (12-29-21 @ 10:00) (15 - 18)  SpO2: 94% (12-29-21 @ 10:00) (94% - 97%)    GENERAL: patient appears well, no acute distress, appropriate, pleasant  EYES: sclera clear, no exudates  ENMT: oropharynx clear without erythema, no exudates, moist mucous membranes  NECK: supple, soft  LUNGS: good air entry bilaterally, clear to auscultation, symmetric breath sounds, no wheezing or rhonchi appreciated  HEART: soft S1/S2, regular rate and rhythm, no murmurs noted, no lower extremity edema  GASTROINTESTINAL: abdomen is soft, nontender, nondistended, normoactive bowel sounds  INTEGUMENT: warm, well-perfused, good skin turgor  MUSCULOSKELETAL: no clubbing or cyanosis, no obvious deformity  NEUROLOGIC: awake, alert, oriented x3, able to move all 4 extremities, no obvious sensory deficits  PSYCHIATRIC: mood is good, affect is congruent, linear and logical thought process  HEME/LYMPH: no obvious ecchymosis or petechiae  Vital Signs Last 24 Hrs  T(C): 36.4 (30 Dec 2021 11:12), Max: 37 (29 Dec 2021 17:31)  T(F): 97.5 (30 Dec 2021 11:12), Max: 98.6 (29 Dec 2021 17:31)  HR: 68 (30 Dec 2021 11:12) (58 - 72)  BP: 136/69 (30 Dec 2021 11:12) (116/75 - 149/78)  RR: 18 (30 Dec 2021 11:12) (17 - 18)  SpO2: 94% (30 Dec 2021 11:12) (94% - 98%)    GENERAL: patient appears well, no acute distress, appropriate, pleasant  EYES: sclera clear, no exudates  ENMT: oropharynx clear without erythema, no exudates, moist mucous membranes  NECK: supple, soft  LUNGS: good air entry bilaterally, clear to auscultation, symmetric breath sounds, no wheezing or rhonchi appreciated  HEART: soft S1/S2, regular rate and rhythm, no murmurs noted, no lower extremity edema  GASTROINTESTINAL: abdomen is soft, nontender, nondistended, normoactive bowel sounds  INTEGUMENT: warm, well-perfused, good skin turgor  MUSCULOSKELETAL: no clubbing or cyanosis, no obvious deformity  NEUROLOGIC: awake, alert, oriented x3, able to move all 4 extremities, no obvious sensory deficits  PSYCHIATRIC: mood is good, affect is congruent, linear and logical thought process

## 2021-12-29 NOTE — PROGRESS NOTE ADULT - SUBJECTIVE AND OBJECTIVE BOX
Patient is a 81y old  Female who presents with a chief complaint of Left sided rib pain. (29 Dec 2021 12:27)    INTERVAL HPI/OVERNIGHT EVENTS:  Patient was seen and examined at bedside. No acute events overnight. Patient is feeling better    MEDICATIONS  (STANDING):  apixaban 10 milliGRAM(s) Oral every 12 hours  atorvastatin 20 milliGRAM(s) Oral at bedtime  benzonatate 100 milliGRAM(s) Oral three times a day  levothyroxine 50 MICROGram(s) Oral daily  polyethylene glycol 3350 17 Gram(s) Oral daily    MEDICATIONS  (PRN):  acetaminophen     Tablet .. 650 milliGRAM(s) Oral every 6 hours PRN Temp greater or equal to 38C (100.4F), Mild Pain (1 - 3)  ALBUTerol    90 MICROgram(s) HFA Inhaler 2 Puff(s) Inhalation every 6 hours PRN Shortness of Breath and/or Wheezing  morphine  - Injectable 2 milliGRAM(s) IV Push every 4 hours PRN Moderate Pain (4 - 6)  morphine  - Injectable 4 milliGRAM(s) IV Push every 4 hours PRN Severe Pain (7 - 10)      Allergies    IV Contrast (Swelling)  sulfa drugs (Flushing; Hives)  sulfa drugs (Unknown)    Intolerances    codeine (Vomiting)    REVIEW OF SYSTEMS:  CONSTITUTIONAL: No fever or chills  HEENT: No headache, no sore throat  RESPIRATORY: No cough, wheezing, or shortness of breath  CARDIOVASCULAR: No chest pain, palpitations  GASTROINTESTINAL: No abd pain, nausea, vomiting, or diarrhea  GENITOURINARY: No dysuria, frequency, or hematuria  NEUROLOGICAL: No focal weakness or dizziness  MUSCULOSKELETAL: No myalgias     PHYSICAL EXAM:  GENERAL: NAD  HEENT: Anicteric, moist mucous membranes  CHEST/LUNG: CTA b/l, no rales, wheezes, or rhonchi  HEART: RRR, S1, S2  ABDOMEN: BS+, soft, nontender, nondistended  EXTREMITIES: No edema, cyanosis, or calf tenderness  NERVOUS SYSTEM: Answers questions and follows commands appropriately    Vital Signs Last 24 Hrs  T(C): 36.5 (29 Dec 2021 10:00), Max: 36.9 (28 Dec 2021 17:33)  T(F): 97.7 (29 Dec 2021 10:00), Max: 98.5 (28 Dec 2021 17:33)  HR: 75 (29 Dec 2021 10:00) (60 - 88)  BP: 142/79 (29 Dec 2021 10:00) (124/63 - 151/77)  BP(mean): --  RR: 17 (29 Dec 2021 10:00) (15 - 18)  SpO2: 94% (29 Dec 2021 10:00) (94% - 97%)    LABS:                        10.4   8.18  )-----------( 223      ( 29 Dec 2021 07:55 )             32.0     CBC Full  -  ( 29 Dec 2021 07:55 )  WBC Count : 8.18 K/uL  Hemoglobin : 10.4 g/dL  Hematocrit : 32.0 %  Platelet Count - Automated : 223 K/uL  Mean Cell Volume : 91.2 fl  Mean Cell Hemoglobin : 29.6 pg  Mean Cell Hemoglobin Concentration : 32.5 gm/dL  Auto Neutrophil # : x  Auto Lymphocyte # : x  Auto Monocyte # : x  Auto Eosinophil # : x  Auto Basophil # : x  Auto Neutrophil % : x  Auto Lymphocyte % : x  Auto Monocyte % : x  Auto Eosinophil % : x  Auto Basophil % : x    29 Dec 2021 07:55    141    |  106    |  15     ----------------------------<  108    3.9     |  27     |  0.88     Ca    8.5        29 Dec 2021 07:55    TPro  6.0    /  Alb  2.3    /  TBili  0.3    /  DBili  x      /  AST  23     /  ALT  33     /  AlkPhos  85     29 Dec 2021 07:55    PT/INR - ( 27 Dec 2021 16:46 )   PT: 14.3 sec;   INR: 1.19 ratio         PTT - ( 27 Dec 2021 16:46 )  PTT:33.9 sec  Urinalysis Basic - ( 27 Dec 2021 16:47 )    Color: Yellow / Appearance: Clear / S.010 / pH: x  Gluc: x / Ketone: Negative  / Bili: Negative / Urobili: Negative mg/dL   Blood: x / Protein: 15 mg/dL / Nitrite: Negative   Leuk Esterase: Negative / RBC: 0-2 /HPF / WBC 0-2   Sq Epi: x / Non Sq Epi: Occasional / Bacteria: x      CAPILLARY BLOOD GLUCOSE      POCT Blood Glucose.: 123 mg/dL (29 Dec 2021 12:13)  POCT Blood Glucose.: 105 mg/dL (29 Dec 2021 07:56)  POCT Blood Glucose.: 110 mg/dL (28 Dec 2021 21:57)  POCT Blood Glucose.: 106 mg/dL (28 Dec 2021 16:37)        Culture - Urine (collected 21 @ 00:56)  Source: Clean Catch Clean Catch (Midstream)  Final Report (21 @ 22:28):    No growth    Culture - Blood (collected 21 @ 00:24)  Source: .Blood Blood-Peripheral  Preliminary Report (21 @ 01:02):    No growth to date.    Culture - Blood (collected 21 @ 00:24)  Source: .Blood Blood-Peripheral  Preliminary Report (21 @ 01:02):    No growth to date.        RADIOLOGY & ADDITIONAL TESTS:    Personally reviewed.     Consultant(s) Notes Reviewed:  [x] YES  [ ] NO Patient is a 81y old  Female who presents with a chief complaint of Left sided rib pain. (29 Dec 2021 12:27)    INTERVAL HPI/OVERNIGHT EVENTS:  Patient was seen and examined at bedside. No acute events overnight. Patient is feeling better but today felt a little nauseous and has not had a BM like she normally does.    MEDICATIONS  (STANDING):  apixaban 10 milliGRAM(s) Oral every 12 hours  atorvastatin 20 milliGRAM(s) Oral at bedtime  benzonatate 100 milliGRAM(s) Oral three times a day  levothyroxine 50 MICROGram(s) Oral daily  polyethylene glycol 3350 17 Gram(s) Oral daily    MEDICATIONS  (PRN):  acetaminophen     Tablet .. 650 milliGRAM(s) Oral every 6 hours PRN Temp greater or equal to 38C (100.4F), Mild Pain (1 - 3)  ALBUTerol    90 MICROgram(s) HFA Inhaler 2 Puff(s) Inhalation every 6 hours PRN Shortness of Breath and/or Wheezing  morphine  - Injectable 2 milliGRAM(s) IV Push every 4 hours PRN Moderate Pain (4 - 6)  morphine  - Injectable 4 milliGRAM(s) IV Push every 4 hours PRN Severe Pain (7 - 10)      Allergies    IV Contrast (Swelling)  sulfa drugs (Flushing; Hives)  sulfa drugs (Unknown)    Intolerances    codeine (Vomiting)    REVIEW OF SYSTEMS:  CONSTITUTIONAL: No fever or chills  HEENT: No headache, no sore throat  RESPIRATORY: No cough, wheezing, or shortness of breath  CARDIOVASCULAR: No chest pain, palpitations  GASTROINTESTINAL: No abd pain, nausea, vomiting, or diarrhea  GENITOURINARY: No dysuria, frequency, or hematuria  NEUROLOGICAL: No focal weakness or dizziness  MUSCULOSKELETAL: No myalgias     PHYSICAL EXAM:  GENERAL: NAD  HEENT: Anicteric, moist mucous membranes  CHEST/LUNG: CTA b/l, no rales, wheezes, or rhonchi  HEART: RRR, S1, S2  ABDOMEN: BS+, soft, nontender, nondistended  EXTREMITIES: No edema, cyanosis, or calf tenderness  NERVOUS SYSTEM: Answers questions and follows commands appropriately    Vital Signs Last 24 Hrs  T(C): 36.5 (29 Dec 2021 10:00), Max: 36.9 (28 Dec 2021 17:33)  T(F): 97.7 (29 Dec 2021 10:00), Max: 98.5 (28 Dec 2021 17:33)  HR: 75 (29 Dec 2021 10:00) (60 - 88)  BP: 142/79 (29 Dec 2021 10:00) (124/63 - 151/77)  BP(mean): --  RR: 17 (29 Dec 2021 10:00) (15 - 18)  SpO2: 94% (29 Dec 2021 10:00) (94% - 97%)    LABS:                        10.4   8.18  )-----------( 223      ( 29 Dec 2021 07:55 )             32.0     CBC Full  -  ( 29 Dec 2021 07:55 )  WBC Count : 8.18 K/uL  Hemoglobin : 10.4 g/dL  Hematocrit : 32.0 %  Platelet Count - Automated : 223 K/uL  Mean Cell Volume : 91.2 fl  Mean Cell Hemoglobin : 29.6 pg  Mean Cell Hemoglobin Concentration : 32.5 gm/dL  Auto Neutrophil # : x  Auto Lymphocyte # : x  Auto Monocyte # : x  Auto Eosinophil # : x  Auto Basophil # : x  Auto Neutrophil % : x  Auto Lymphocyte % : x  Auto Monocyte % : x  Auto Eosinophil % : x  Auto Basophil % : x    29 Dec 2021 07:55    141    |  106    |  15     ----------------------------<  108    3.9     |  27     |  0.88     Ca    8.5        29 Dec 2021 07:55    TPro  6.0    /  Alb  2.3    /  TBili  0.3    /  DBili  x      /  AST  23     /  ALT  33     /  AlkPhos  85     29 Dec 2021 07:55    PT/INR - ( 27 Dec 2021 16:46 )   PT: 14.3 sec;   INR: 1.19 ratio         PTT - ( 27 Dec 2021 16:46 )  PTT:33.9 sec  Urinalysis Basic - ( 27 Dec 2021 16:47 )    Color: Yellow / Appearance: Clear / S.010 / pH: x  Gluc: x / Ketone: Negative  / Bili: Negative / Urobili: Negative mg/dL   Blood: x / Protein: 15 mg/dL / Nitrite: Negative   Leuk Esterase: Negative / RBC: 0-2 /HPF / WBC 0-2   Sq Epi: x / Non Sq Epi: Occasional / Bacteria: x      CAPILLARY BLOOD GLUCOSE      POCT Blood Glucose.: 123 mg/dL (29 Dec 2021 12:13)  POCT Blood Glucose.: 105 mg/dL (29 Dec 2021 07:56)  POCT Blood Glucose.: 110 mg/dL (28 Dec 2021 21:57)  POCT Blood Glucose.: 106 mg/dL (28 Dec 2021 16:37)        Culture - Urine (collected 21 @ 00:56)  Source: Clean Catch Clean Catch (Midstream)  Final Report (21 @ 22:28):    No growth    Culture - Blood (collected 21 @ 00:24)  Source: .Blood Blood-Peripheral  Preliminary Report (21 @ 01:02):    No growth to date.    Culture - Blood (collected 21 @ 00:24)  Source: .Blood Blood-Peripheral  Preliminary Report (21 @ 01:02):    No growth to date.      RADIOLOGY & ADDITIONAL TESTS:  TTE:  1. Normal left ventricular size and systolic function with estimated   ejection fraction 65-70%. Mild diastolic dysfunction (stage I).  2. Grossly normal right ventricular size and function.  3. Minimal mitral regurgitation.  4. Minimal tricuspid regurgitation.  5. Normal estimated pulmonary artery pressure.    Personally reviewed.     Consultant(s) Notes Reviewed:  [x] YES  [ ] NO

## 2021-12-29 NOTE — DISCHARGE NOTE PROVIDER - HOSPITAL COURSE
HPI:  This is an 82 y/o F with PMH of DM type 2 currently off meds on diet control, Dyslipidemia, Asthma, and Hypothyroidism who presented with 10/10 left lower rib sharp stabbing pain radiating to her lower posterior chest wall, worsens by deep breathing & lying down, improves when sitting up or standing, associated with occasional wheezing, and cough productive of scanty thin clear non bloody sputum. Pain & cough were started 2 weeks ago, following a flight from [place], but was much more mild, became much worse about 6 days ago, so she saw her pulmonary who obtained a CXR, diagnosed her as a LLL PNA, gave her Ceftin that she took for 5 days with marked improvement, but today started to worsen again, so she called her pulmonary who recommended an ED visit. Patient reports low grade fever yesterday, no chills, T max 99.6 F, but normal for the whole day today, denies other flu-like symptoms, tested negative for COVID-19 last week, no history of any blood clotting problem in the past, denies any leg/calf pains, patient is allergic to IV dye. (27 Dec 2021 21:43)    ---  HOSPITAL COURSE:   Patient was admitted for left lower rib pain with cough. Doppler showed DVT of left popliteal, posterior tibial and peroneal veins. CT showed trace left pleural effusion with left lower lung areas of atelectasis; adjacent groundglass within the left lower lobe at the left lung base with a nodular component at the left costophrenic angle can be seen in the setting of pulmonary infarction. CT without contrast (as patient allergic to IV contrast) presenting complaint both suggestive of a pulmonary infarct and PE on left side. TTE was grossly normal. Patient improved clinically throughout hospitalization. Per pulm Dr Underwood, there was no need for VQ scan and patient to be discharged, with outpatient follow up with [facility] for hypercoagulation work up. HPI:  This is an 82 y/o F with PMH of DM type 2 currently off meds on diet control, Dyslipidemia, Asthma, and Hypothyroidism who presented with 10/10 left lower rib sharp stabbing pain radiating to her lower posterior chest wall, worsens by deep breathing & lying down, improves when sitting up or standing, associated with occasional wheezing, and cough productive of scanty thin clear non bloody sputum. Pain & cough were started 2 weeks ago, following a flight from [place], but was much more mild, became much worse about 6 days ago, so she saw her pulmonary who obtained a CXR, diagnosed her as a LLL PNA, gave her Ceftin that she took for 5 days with marked improvement, but today started to worsen again, so she called her pulmonary who recommended an ED visit. Patient reports low grade fever yesterday, no chills, T max 99.6 F, but normal for the whole day today, denies other flu-like symptoms, tested negative for COVID-19 last week, no history of any blood clotting problem in the past, denies any leg/calf pains, patient is allergic to IV dye. (27 Dec 2021 21:43)    ---  HOSPITAL COURSE:   Patient was admitted for left lower rib pain with cough. Doppler showed DVT of left popliteal, posterior tibial and peroneal veins. CT showed trace left pleural effusion with left lower lung areas of atelectasis; adjacent groundglass within the left lower lobe at the left lung base with a nodular component at the left costophrenic angle can be seen in the setting of pulmonary infarction. CT without contrast (as patient allergic to IV contrast) presenting complaint both suggestive of a pulmonary infarct and PE on left side. TTE was grossly normal. Patient improved clinically throughout hospitalization. Per pulm Dr Underwood, there was no need for VQ scan and patient to be discharged, with outpatient follow up with [facility] for hypercoagulation work up.     Patient had nausea likely related to constipation with decreased PO intake which resolved. Xray Abdomen: Frontal view of the abdomen shows a few air-filled small bowel loops. Colonic gas pattern is normal. Fecal residue is seen in the ascending and descending colons. No definite free air is identified. No evidence of bowel obstruction. She had bowel movement after initiating bowel regimen with Miralax, Senna, Dulcolax suppository and enema. Discharge plan discussed with patient and her .

## 2021-12-30 ENCOUNTER — TRANSCRIPTION ENCOUNTER (OUTPATIENT)
Age: 81
End: 2021-12-30

## 2021-12-30 VITALS
DIASTOLIC BLOOD PRESSURE: 81 MMHG | TEMPERATURE: 98 F | RESPIRATION RATE: 18 BRPM | HEART RATE: 77 BPM | SYSTOLIC BLOOD PRESSURE: 134 MMHG | OXYGEN SATURATION: 98 %

## 2021-12-30 PROCEDURE — 85379 FIBRIN DEGRADATION QUANT: CPT

## 2021-12-30 PROCEDURE — 93005 ELECTROCARDIOGRAM TRACING: CPT

## 2021-12-30 PROCEDURE — 83880 ASSAY OF NATRIURETIC PEPTIDE: CPT

## 2021-12-30 PROCEDURE — 71045 X-RAY EXAM CHEST 1 VIEW: CPT

## 2021-12-30 PROCEDURE — 85610 PROTHROMBIN TIME: CPT

## 2021-12-30 PROCEDURE — 93970 EXTREMITY STUDY: CPT

## 2021-12-30 PROCEDURE — 82962 GLUCOSE BLOOD TEST: CPT

## 2021-12-30 PROCEDURE — 99239 HOSP IP/OBS DSCHRG MGMT >30: CPT

## 2021-12-30 PROCEDURE — 80048 BASIC METABOLIC PNL TOTAL CA: CPT

## 2021-12-30 PROCEDURE — 96374 THER/PROPH/DIAG INJ IV PUSH: CPT

## 2021-12-30 PROCEDURE — 85730 THROMBOPLASTIN TIME PARTIAL: CPT

## 2021-12-30 PROCEDURE — 86140 C-REACTIVE PROTEIN: CPT

## 2021-12-30 PROCEDURE — 83090 ASSAY OF HOMOCYSTEINE: CPT

## 2021-12-30 PROCEDURE — 36415 COLL VENOUS BLD VENIPUNCTURE: CPT

## 2021-12-30 PROCEDURE — 87635 SARS-COV-2 COVID-19 AMP PRB: CPT

## 2021-12-30 PROCEDURE — 87086 URINE CULTURE/COLONY COUNT: CPT

## 2021-12-30 PROCEDURE — 71250 CT THORAX DX C-: CPT | Mod: MA

## 2021-12-30 PROCEDURE — 96375 TX/PRO/DX INJ NEW DRUG ADDON: CPT

## 2021-12-30 PROCEDURE — 74019 RADEX ABDOMEN 2 VIEWS: CPT

## 2021-12-30 PROCEDURE — 80053 COMPREHEN METABOLIC PANEL: CPT

## 2021-12-30 PROCEDURE — 82785 ASSAY OF IGE: CPT

## 2021-12-30 PROCEDURE — 83605 ASSAY OF LACTIC ACID: CPT

## 2021-12-30 PROCEDURE — 81001 URINALYSIS AUTO W/SCOPE: CPT

## 2021-12-30 PROCEDURE — 93306 TTE W/DOPPLER COMPLETE: CPT

## 2021-12-30 PROCEDURE — 85027 COMPLETE CBC AUTOMATED: CPT

## 2021-12-30 PROCEDURE — 87040 BLOOD CULTURE FOR BACTERIA: CPT

## 2021-12-30 PROCEDURE — 84443 ASSAY THYROID STIM HORMONE: CPT

## 2021-12-30 PROCEDURE — 84484 ASSAY OF TROPONIN QUANT: CPT

## 2021-12-30 PROCEDURE — 74019 RADEX ABDOMEN 2 VIEWS: CPT | Mod: 26

## 2021-12-30 PROCEDURE — 99285 EMERGENCY DEPT VISIT HI MDM: CPT

## 2021-12-30 PROCEDURE — 85025 COMPLETE CBC W/AUTO DIFF WBC: CPT

## 2021-12-30 PROCEDURE — 84145 PROCALCITONIN (PCT): CPT

## 2021-12-30 RX ORDER — SIMETHICONE 80 MG/1
80 TABLET, CHEWABLE ORAL ONCE
Refills: 0 | Status: COMPLETED | OUTPATIENT
Start: 2021-12-30 | End: 2021-12-30

## 2021-12-30 RX ORDER — SENNA PLUS 8.6 MG/1
2 TABLET ORAL
Qty: 0 | Refills: 0 | DISCHARGE
Start: 2021-12-30

## 2021-12-30 RX ADMIN — Medication 100 MILLIGRAM(S): at 07:34

## 2021-12-30 RX ADMIN — Medication 10 MILLIGRAM(S): at 14:37

## 2021-12-30 RX ADMIN — POLYETHYLENE GLYCOL 3350 17 GRAM(S): 17 POWDER, FOR SOLUTION ORAL at 13:02

## 2021-12-30 RX ADMIN — Medication 50 MICROGRAM(S): at 07:34

## 2021-12-30 RX ADMIN — APIXABAN 10 MILLIGRAM(S): 2.5 TABLET, FILM COATED ORAL at 07:34

## 2021-12-30 RX ADMIN — SIMETHICONE 80 MILLIGRAM(S): 80 TABLET, CHEWABLE ORAL at 17:17

## 2021-12-30 RX ADMIN — Medication 100 MILLIGRAM(S): at 13:02

## 2021-12-30 RX ADMIN — APIXABAN 10 MILLIGRAM(S): 2.5 TABLET, FILM COATED ORAL at 17:17

## 2021-12-30 NOTE — DISCHARGE NOTE NURSING/CASE MANAGEMENT/SOCIAL WORK - PATIENT PORTAL LINK FT
You can access the FollowMyHealth Patient Portal offered by [facility] by registering at the following website: [email or web address]. By joining [employer]’s FollowMyHealth portal, you will also be able to view your health information using other applications (apps) compatible with our system.

## 2021-12-30 NOTE — PROGRESS NOTE ADULT - ASSESSMENT
82 y/o F with PMH of DM type 2 currently off meds on diet control, Dyslipidemia, Asthma, and Hypothyroidism presented with left lower rib pain with cough.
82 y/o F with PMH of DM type 2 currently off meds on diet control, Dyslipidemia, Asthma, and Hypothyroidism presented with left lower rib pain with cough.    on eliquis  TTE grossly normal  dc home - planning      pt with DVT left  pt with most likely PE and Pulm Infarct on left side  CT chest non contrast noted, pt is allergic to Contrast - not a candidate for CTA chest  LE doppler noted  no need for VQ scan - tx for PE and DVT same - AC -   NO RV strain - trop and ProBNP normal - HD stable - on RA -   dc home  pain rx regimen  follow up with [facility] as outpatient for Hypercoagulation work up and management  min of 3 months of AC - for VTE -   Asthma hx - Proventil PRN  seasonal vaccination  discussed above with pt  
82 y/o F with PMH of DM type 2 currently off meds on diet control, Dyslipidemia, Asthma, and Hypothyroidism presented with left lower rib pain with cough.    on eliquis  TTE grossly normal  will dc IVF      pt with DVT left  pt with most likely PE and Pulm Infarct on left side  CT chest non contrast noted, pt is allergic to Contrast - not a candidate for CTA chest  LE doppler noted  no need for VQ scan - tx for PE and DVT same - AC -   NO RV strain - trop and ProBNP normal - HD stable - on RA -   dc home  pain rx regimen  follow up with [facility] as outpatient for Hypercoagulation work up and management  min of 3 months of AC - for VTE -   Asthma hx - Proventil PRN  seasonal vaccination  discussed above with pt
82 y/o F with PMH of DM type 2 currently off meds on diet control, Dyslipidemia, Asthma, and Hypothyroidism presented with left lower rib pain with cough.

## 2021-12-30 NOTE — PROGRESS NOTE ADULT - SUBJECTIVE AND OBJECTIVE BOX
Date/Time Patient Seen:  		  Referring MD:   Data Reviewed	       Patient is a 81y old  Female who presents with a chief complaint of Left sided rib pain. (29 Dec 2021 13:48)      Subjective/HPI     PAST MEDICAL & SURGICAL HISTORY:  Diabetes    Hypothyroid    High cholesterol    Asthma    S/P oophorectomy          Medication list         MEDICATIONS  (STANDING):  apixaban 10 milliGRAM(s) Oral every 12 hours  atorvastatin 20 milliGRAM(s) Oral at bedtime  benzonatate 100 milliGRAM(s) Oral three times a day  levothyroxine 50 MICROGram(s) Oral daily  polyethylene glycol 3350 17 Gram(s) Oral daily    MEDICATIONS  (PRN):  acetaminophen     Tablet .. 650 milliGRAM(s) Oral every 6 hours PRN Temp greater or equal to 38C (100.4F), Mild Pain (1 - 3)  ALBUTerol    90 MICROgram(s) HFA Inhaler 2 Puff(s) Inhalation every 6 hours PRN Shortness of Breath and/or Wheezing  morphine  - Injectable 2 milliGRAM(s) IV Push every 4 hours PRN Moderate Pain (4 - 6)  morphine  - Injectable 4 milliGRAM(s) IV Push every 4 hours PRN Severe Pain (7 - 10)  senna 2 Tablet(s) Oral daily PRN Constipation         Vitals log        ICU Vital Signs Last 24 Hrs  T(C): 36.4 (30 Dec 2021 06:13), Max: 37 (29 Dec 2021 17:31)  T(F): 97.6 (30 Dec 2021 06:13), Max: 98.6 (29 Dec 2021 17:31)  HR: 58 (30 Dec 2021 06:13) (58 - 75)  BP: 116/75 (30 Dec 2021 06:13) (116/75 - 149/78)  BP(mean): --  ABP: --  ABP(mean): --  RR: 18 (30 Dec 2021 06:13) (17 - 18)  SpO2: 94% (30 Dec 2021 06:13) (94% - 98%)           Input and Output:  I&O's Detail    28 Dec 2021 07:01  -  29 Dec 2021 07:00  --------------------------------------------------------  IN:    Lactated Ringers: 600 mL  Total IN: 600 mL    OUT:  Total OUT: 0 mL    Total NET: 600 mL          Lab Data                        10.4   8.18  )-----------( 223      ( 29 Dec 2021 07:55 )             32.0     12-29    141  |  106  |  15  ----------------------------<  108<H>  3.9   |  27  |  0.88    Ca    8.5      29 Dec 2021 07:55    TPro  6.0  /  Alb  2.3<L>  /  TBili  0.3  /  DBili  x   /  AST  23  /  ALT  33  /  AlkPhos  85  12-29            Review of Systems	      Objective     Physical Examination    heart s1s2  lung dec BS  abd soft  head nc      Pertinent Lab findings & Imaging      Yenny:  NO   Adequate UO     I&O's Detail    28 Dec 2021 07:01  -  29 Dec 2021 07:00  --------------------------------------------------------  IN:    Lactated Ringers: 600 mL  Total IN: 600 mL    OUT:  Total OUT: 0 mL    Total NET: 600 mL               Discussed with:     Cultures:	        Radiology

## 2021-12-30 NOTE — DISCHARGE NOTE NURSING/CASE MANAGEMENT/SOCIAL WORK - NSDCPEFALRISK_GEN_ALL_CORE
For information on Fall & Injury Prevention, visit: [email or web address] OR  [email or web address] OR  https://www.cdc.gov/steadi/patient.html

## 2021-12-31 LAB — IGE SERPL-ACNC: 3 KU/L — SIGNIFICANT CHANGE UP

## 2022-01-03 ENCOUNTER — TRANSCRIPTION ENCOUNTER (OUTPATIENT)
Age: 82
End: 2022-01-03

## 2022-01-25 ENCOUNTER — TRANSCRIPTION ENCOUNTER (OUTPATIENT)
Age: 82
End: 2022-01-25

## 2022-03-21 PROBLEM — J45.909 UNSPECIFIED ASTHMA, UNCOMPLICATED: Chronic | Status: ACTIVE | Noted: 2021-12-27

## 2022-05-25 ENCOUNTER — APPOINTMENT (OUTPATIENT)
Dept: PULMONOLOGY | Facility: CLINIC | Age: 82
End: 2022-05-25
Payer: MEDICARE

## 2022-05-25 VITALS
RESPIRATION RATE: 16 BRPM | OXYGEN SATURATION: 98 % | DIASTOLIC BLOOD PRESSURE: 80 MMHG | HEART RATE: 69 BPM | TEMPERATURE: 98 F | SYSTOLIC BLOOD PRESSURE: 140 MMHG | BODY MASS INDEX: 31.04 KG/M2 | WEIGHT: 154 LBS | HEIGHT: 59 IN

## 2022-05-25 DIAGNOSIS — R06.02 SHORTNESS OF BREATH: ICD-10-CM

## 2022-05-25 DIAGNOSIS — Z87.891 PERSONAL HISTORY OF NICOTINE DEPENDENCE: ICD-10-CM

## 2022-05-25 DIAGNOSIS — Z87.898 PERSONAL HISTORY OF OTHER SPECIFIED CONDITIONS: ICD-10-CM

## 2022-05-25 DIAGNOSIS — Z78.9 OTHER SPECIFIED HEALTH STATUS: ICD-10-CM

## 2022-05-25 DIAGNOSIS — Z82.3 FAMILY HISTORY OF STROKE: ICD-10-CM

## 2022-05-25 DIAGNOSIS — Z80.1 FAMILY HISTORY OF MALIGNANT NEOPLASM OF TRACHEA, BRONCHUS AND LUNG: ICD-10-CM

## 2022-05-25 DIAGNOSIS — Z83.3 FAMILY HISTORY OF DIABETES MELLITUS: ICD-10-CM

## 2022-05-25 DIAGNOSIS — Z87.19 PERSONAL HISTORY OF OTHER DISEASES OF THE DIGESTIVE SYSTEM: ICD-10-CM

## 2022-05-25 DIAGNOSIS — Z71.89 OTHER SPECIFIED COUNSELING: ICD-10-CM

## 2022-05-25 DIAGNOSIS — Z86.39 PERSONAL HISTORY OF OTHER ENDOCRINE, NUTRITIONAL AND METABOLIC DISEASE: ICD-10-CM

## 2022-05-25 PROCEDURE — 94010 BREATHING CAPACITY TEST: CPT

## 2022-05-25 PROCEDURE — 94729 DIFFUSING CAPACITY: CPT

## 2022-05-25 PROCEDURE — 71046 X-RAY EXAM CHEST 2 VIEWS: CPT

## 2022-05-25 PROCEDURE — 99204 OFFICE O/P NEW MOD 45 MIN: CPT | Mod: 25

## 2022-05-25 PROCEDURE — 95012 NITRIC OXIDE EXP GAS DETER: CPT

## 2022-05-25 PROCEDURE — 94618 PULMONARY STRESS TESTING: CPT

## 2022-05-25 PROCEDURE — 94727 GAS DIL/WSHOT DETER LNG VOL: CPT

## 2022-05-25 NOTE — PROCEDURE
[FreeTextEntry1] : Feno was 31; a normal value being less than 25. Fractional exhaled nitric oxide (FENO) is regarded as a simple, noninvasive method for assessing eosinophilic airway inflammation. Produced by a variety of cells within the lung, nitric oxide (NO) concentrations are generally low in healthy individuals. However, high concentrations of NO appear to be involved in nonspecific host defense mechanisms and chronic inflammatory  diseases such as asthma. The American Thoracic Society (ATS) therefore recommended using FENO to aid in the diagnosis and monitoring of eosinophilic airway inflammation and asthma, and for identifying steroid responsive individuals whose chronic respiratory symptoms may be caused by airway inflammation \par \par 6 minute walk test reveals a low saturation of 95% with slight  dyspnea or fatigue; walked 489 meters\par  \par \par Full PFT revealed mild obstructive dysfunction at mid to low lung volumes, with a FEV1 of 1.33L, which is 85% of predicted, normal lung volumes, and a diffusion of 13.3, which is 78% of predicted, with a normal flow volume loop\par \par CT chest 12/27/2021: trace pleural effusion. adjacent GG LLL nodular component at left CP angle. Recommended CT angiogram\par \par Echo 12/28/2021: normal LV, size and systolic function. no evidence of PAH. \par \par LE Doppler: left popliteal posterior tibial and peroneal vein DVT.\par \par CXR revealed a normal sized heart; there was no evidence of infiltrate or effusion -- A normal appearing chest radiograph \par \par -Images and procedures reviewed in detail and discussed with patient.

## 2022-05-25 NOTE — REASON FOR VISIT
[Initial] : an initial visit [TextBox_44] : SOB, post-PE/DVT 12/2021 (unprovoked despite travelling), mild snoring, ?HELADIO

## 2022-05-25 NOTE — ADDENDUM
[FreeTextEntry1] : Documented by Zaheer Cr acting as a scribe for Dr. Gucci Birch on (05/25/2022).\par \par All medical record entries made by the Scribe were at my, Dr. Gucci Birch's, direction and personally dictated by me on (05/25/2022). I have reviewed the chart and agree that the record accurately reflects my personal performance of the history, physical exam, assessment and plan. I have also personally directed, reviewed, and agree with the discharge instructions.\par

## 2022-05-25 NOTE — ASSESSMENT
[FreeTextEntry1] : Ms. COLON is a 82 year female with a history of former remote smoker 15 years, DM, HLD, ovarian cyst, IBS, hypothyroid, PE diagnosed 12/2021 s/p 6 months Eliquis, who now comes in for an initial pulmonary evaluation for SOB, post-PE/DVT 12/2021 (unprovoked despite travelling), mild snoring, ?HELADIO\par \par The patient's SOB is felt to be multifactorial:\par -poor mechanics of breathing\par -out of shape/overweight\par -Pulmonary\par    -PE\par -Cardiac\par \par Problem 1: h/o PE (resolved)\par - s/p 6 months of Eliquis\par -Continue to follow up with hematology (Dr. Lucas)\par -Recommended Lovenox therapy when travelling as per Dr. Mcneal\par -Blood clots are noted w/in your lungs diagnosed by either abnormal CTPA or ventilation perfusion scan. You will need a hypercoagulable work up done by a hematologist to attempt to identify the etiology of this problem. Anticoagulation will be needed for at least 6 months- drugs included are Coumadin, lovenox, arixtra, or another agent. Repeat CTPA or VQ scan will be needed in approximately 6-8 weeks. An echocardiogram may be needed to assess for right ventricular function and pulmonary hypertension. The importance of hydration, ambulation, and regular/consistent diet are extremely important. \par \par Problem 2: ?HELADIO\par -complete home sleep study\par -Sleep apnea is associated with adverse clinical consequences which can affect most organ systems. Cardiovascular disease risk includes arrhythmias, atrial fibrillation, hypertension, coronary artery disease, and stroke. Metabolic disorders include diabetes type 2, non-alcoholic fatty liver disease. Mood disorder especially depression; and cognitive decline especially in the elderly. Associations with chronic reflux/Irwin’s esophagus some but not all inclusive. \par -Reasons include arousal consistent with hypopnea; respiratory events most prominent in REM sleep or supine position; therefore sleep staging and body position are important for accurate diagnosis and estimation of AHI. \par  \par \par Problem 3: covid 19 education \par -s/p covid 19 vaccine x4 \par -Call the office immediately if covid 19 positive\par \par Problem 4:\par \par Problem 5:\par \par Problem :Cardiac\par -Recommend cardiac follow up evaluation with cardiologist if needed \par \par Problem :overweight/out of shape\par - Weight loss, exercise and diet control were discussed and are highly encouraged. Treatment options were given such as aqua therapy, and contacting a nutritionist. Recommended to use the elliptical, stationary bike, less use of treadmill. Mindful eating was explained to the patient. Obesity is associated with worsening asthma, SOB, and potential for cardiac disease, diabetes, and other underlying medical conditions.\par \par Problem : Poor mechanics of breathing\par 1. Search up breathing techniques on Youtube; [employer] and [employer] \par 2. Internet Exercise Platforms; [employer] and Aerobic Exercises for Seniors \par - Proper breathing techniques were reviewed with an emphasis on exhalation. Patient instructed to breath in for 1 second and out for four seconds. Patient was encouraged not to talk while walking.\par \par Problem : Health Maintenance\par -s/p covid 19 vaccine x4 \par -s/p flu shot\par -recommended strep pneumonia vaccines: Prevnar-13 vaccine, follow by Pneumo vaccine 23 one year following\par -recommended early intervention for URIs\par -recommended regular osteoporosis evaluations\par -recommended early dermatological evaluations\par -recommended after the age of 50 to the age of 70, colonoscopy every 5 years\par \par f/u in 6-8 weeks\par pt is encouraged to call or fax the office with any questions or concerns.\par

## 2022-05-25 NOTE — PHYSICAL EXAM
[No Acute Distress] : no acute distress [Normal Oropharynx] : normal oropharynx [III] : Mallampati Class: III [Normal Appearance] : normal appearance [No Neck Mass] : no neck mass [Normal Rate/Rhythm] : normal rate/rhythm [Murmur ___ / 6] : murmur [unfilled] / 6 [Normal S1, S2] : normal s1, s2 [No Resp Distress] : no resp distress [Clear to Auscultation Bilaterally] : clear to auscultation bilaterally [No Abnormalities] : no abnormalities [Benign] : benign [Normal Gait] : normal gait [No Clubbing] : no clubbing [No Cyanosis] : no cyanosis [No Edema] : no edema [FROM] : FROM [Normal Color/ Pigmentation] : normal color/ pigmentation [No Focal Deficits] : no focal deficits [Oriented x3] : oriented x3 [Normal Affect] : normal affect [TextBox_2] : OW [TextBox_68] : I:E 1:3; mild expiratory wheeze

## 2022-05-25 NOTE — HISTORY OF PRESENT ILLNESS
[TextBox_4] : Ms. COLON is a 82 year female with a history of former remote smoker 15 years, DM, HLD, ovarian cyst, hypothyroid, PE diagnosed 12/2021 (on 6 months Eliquis- 1 week left) who now comes in for an initial pulmonary evaluation. Her chief complaint is\par -she notes blood clot when hiking which brought on cough and left sided rib pain\par -prior to hospitalization, pt has seen Dr. Coyne and was misdiagnosed with PNA\par -She is now feeling back to her baseline since her PE \par -She is now less active due to pandemic \par -denies SOB\par -Denies SOB on her back or in the middle of the night \par -denies neck or back pain \par -she notes nocturnal leg cramps, fluctuates between legs \par -she notes snoring \par -she does not feel well rested in the morning \par -she notes poor sleep\par -she notes nocturia 1x per night\par -her weight is stable \par -She is s/p hematology evaluation 2-3 months ago which showed blood clot is resolved \par -she notes rhinorrhea in the morning \par -denies seasonal allergies \par -s/p covid 19 vaccine x4\par -has not been infected with covid 19 \par -She was misdiagnosed with PNA \par - she reports being able to fall asleep while watching a boring TV show \par \par \par - patient denies any headaches, nausea, vomiting, fever, chills, sweats, chest pain, chest pressure, palpitations, coughing, wheezing, fatigue, diarrhea, constipation, dysphagia, myalgias, dizziness, leg swelling, leg pain, itchy eyes, itchy ears, heartburn, reflux or sour taste in the mouth

## 2022-07-27 ENCOUNTER — APPOINTMENT (OUTPATIENT)
Dept: PULMONOLOGY | Facility: CLINIC | Age: 82
End: 2022-07-27

## 2022-07-27 ENCOUNTER — NON-APPOINTMENT (OUTPATIENT)
Age: 82
End: 2022-07-27

## 2022-07-27 VITALS
RESPIRATION RATE: 16 BRPM | HEART RATE: 71 BPM | DIASTOLIC BLOOD PRESSURE: 70 MMHG | BODY MASS INDEX: 29.64 KG/M2 | WEIGHT: 151 LBS | HEIGHT: 60 IN | TEMPERATURE: 97.2 F | SYSTOLIC BLOOD PRESSURE: 118 MMHG | OXYGEN SATURATION: 98 %

## 2022-07-27 DIAGNOSIS — R06.02 SHORTNESS OF BREATH: ICD-10-CM

## 2022-07-27 DIAGNOSIS — R06.83 SNORING: ICD-10-CM

## 2022-07-27 DIAGNOSIS — E66.3 OVERWEIGHT: ICD-10-CM

## 2022-07-27 DIAGNOSIS — Z86.711 PERSONAL HISTORY OF PULMONARY EMBOLISM: ICD-10-CM

## 2022-07-27 DIAGNOSIS — J45.909 UNSPECIFIED ASTHMA, UNCOMPLICATED: ICD-10-CM

## 2022-07-27 PROCEDURE — 94010 BREATHING CAPACITY TEST: CPT

## 2022-07-27 PROCEDURE — 99214 OFFICE O/P EST MOD 30 MIN: CPT | Mod: 25

## 2022-07-27 RX ORDER — ALBUTEROL SULFATE 90 UG/1
108 (90 BASE) INHALANT RESPIRATORY (INHALATION)
Qty: 1 | Refills: 3 | Status: ACTIVE | COMMUNITY
Start: 2022-07-27 | End: 1900-01-01

## 2022-07-27 RX ORDER — LEVOTHYROXINE SODIUM 75 UG/1
75 TABLET ORAL
Qty: 90 | Refills: 0 | Status: ACTIVE | COMMUNITY
Start: 2022-04-05

## 2022-07-27 NOTE — ASSESSMENT
[FreeTextEntry1] : Ms. COLON is a 82 year female with a history of former remote smoker 15 years, DM, HLD, ovarian cyst, IBS, hypothyroid, PE diagnosed 12/2021 s/p 6 months Eliquis, who now comes in for a f/u pulmonary evaluation for SOB, post-PE/DVT 12/2021 (unprovoked despite travelling), mild snoring, ?HELADIO, mild intermittent asthma \par \par The patient's SOB is felt to be multifactorial:\par -poor mechanics of breathing\par -out of shape/overweight\par -Pulmonary\par    -PE\par -Cardiac\par \par Problem 1a: h/o PE (resolved)\par - s/p 6 months of Eliquis\par -Continue to follow up with hematology (Dr. Lucas)\par -Recommended Lovenox therapy when travelling as per Dr. Mcneal\par -Blood clots are noted w/in your lungs diagnosed by either abnormal CTPA or ventilation perfusion scan. You will need a hypercoagulable work up done by a hematologist to attempt to identify the etiology of this problem. Anticoagulation will be needed for at least 6 months- drugs included are Coumadin, lovenox, arixtra, or another agent. Repeat CTPA or VQ scan will be needed in approximately 6-8 weeks. An echocardiogram may be needed to assess for right ventricular function and pulmonary hypertension. The importance of hydration, ambulation, and regular/consistent diet are extremely important. \par \par Problem 1: Mild intermittent asthma \par -add Ventolin 2 puffs Q6H, pre-exercise \par -Hold PFTs today (07/27/2022) \par - Inhaler technique reviewed as well as oral hygiene technique reviewed with patient. Avoidance of cold air, extremes of temperature, rescue inhaler should be used before exercise. Order of medication reviewed with patient. Recommended use of a cool mist humidifier in the bedroom. \par - Asthma is believed to be caused by inherited (genetic) and environmental factor, but its exact cause is unknown. asthma may be triggered by allergens, lung infections, or irritants in the air. Asthma triggers are different for each person \par \par Problem 2: ?HELADIO\par -s/p home sleep study\par -recommended dental device (Danoff)\par -Sleep apnea is associated with adverse clinical consequences which can affect most organ systems. Cardiovascular disease risk includes arrhythmias, atrial fibrillation, hypertension, coronary artery disease, and stroke. Metabolic disorders include diabetes type 2, non-alcoholic fatty liver disease. Mood disorder especially depression; and cognitive decline especially in the elderly. Associations with chronic reflux/Irwin’s esophagus some but not all inclusive. \par -Reasons include arousal consistent with hypopnea; respiratory events most prominent in REM sleep or supine position; therefore sleep staging and body position are important for accurate diagnosis and estimation of AHI. \par  \par \par Problem 3: covid 19 education \par -s/p covid 19 vaccine x4 \par -Call the office immediately if covid 19 positive\par \par Problem 4:Cardiac\par -Recommend cardiac follow up evaluation with cardiologist if needed (Dali)\par \par Problem 5:overweight/out of shape\par - recommended "10-Day Detox" by Krzysztof Truong \par - Weight loss, exercise and diet control were discussed and are highly encouraged. Treatment options were given such as aqua therapy, and contacting a nutritionist. Recommended to use the elliptical, stationary bike, less use of treadmill. Mindful eating was explained to the patient. Obesity is associated with worsening asthma, SOB, and potential for cardiac disease, diabetes, and other underlying medical conditions.\par \par Problem 6: Poor mechanics of breathing\par 1. Search up breathing techniques on Youtube; Michelle Pisano \par 2. Internet Exercise Platforms; [email or web address] and Aerobic Exercises for Seniors \par - Proper breathing techniques were reviewed with an emphasis on exhalation. Patient instructed to breath in for 1 second and out for four seconds. Patient was encouraged not to talk while walking.\par \par Problem 7: Health Maintenance\par -s/p covid 19 vaccine x4 \par -s/p flu shot 2021 \par -recommended strep pneumonia vaccines: Prevnar-13 vaccine, follow by Pneumo vaccine 23 one year following\par -recommended early intervention for URIs\par -recommended regular osteoporosis evaluations\par -recommended early dermatological evaluations\par -recommended after the age of 50 to the age of 70, colonoscopy every 5 years\par \par f/u in 6-8 weeks\par pt is encouraged to call or fax the office with any questions or concerns.\par

## 2022-07-27 NOTE — PHYSICAL EXAM
[No Acute Distress] : no acute distress [Normal Oropharynx] : normal oropharynx [III] : Mallampati Class: III [Normal Appearance] : normal appearance [No Neck Mass] : no neck mass [Normal Rate/Rhythm] : normal rate/rhythm [Murmur ___ / 6] : murmur [unfilled] / 6 [Normal S1, S2] : normal s1, s2 [No Resp Distress] : no resp distress [Clear to Auscultation Bilaterally] : clear to auscultation bilaterally [No Abnormalities] : no abnormalities [Benign] : benign [Normal Gait] : normal gait [No Clubbing] : no clubbing [No Cyanosis] : no cyanosis [No Edema] : no edema [FROM] : FROM [Normal Color/ Pigmentation] : normal color/ pigmentation [No Focal Deficits] : no focal deficits [Oriented x3] : oriented x3 [Normal Affect] : normal affect [TextBox_2] : OW [TextBox_68] : I:E 1:3; Clear

## 2022-07-27 NOTE — ADDENDUM
[FreeTextEntry1] : Documented by Edmundo Barfield acting as a scribe for Dr. Gucci Birch on 07/27/2022.\par \par All medical record entries made by the Scribe were at my, Dr. Gucci Birch's, direction and personally dictated by me on 07/27/2022. I have reviewed the chart and agree that the record accurately reflects my personal performance of the history, physical exam, assessment and plan. I have also personally directed, reviewed, and agree with the discharge instructions.

## 2022-07-27 NOTE — REASON FOR VISIT
[Follow-Up] : a follow-up visit [TextBox_44] : SOB, post-PE/DVT 12/2021 (unprovoked despite travelling), mild snoring, ?HELADIO, mild intermittent asthma

## 2022-07-27 NOTE — PROCEDURE
[FreeTextEntry1] : PFTs-normal flows and FV loop--fev1 was1.42 liters-94%\par \par Feno was unable to be completed ; a normal value being less than 25. Fractional exhaled nitric oxide (FENO) is regarded as a simple, noninvasive method for assessing eosinophilic airway inflammation. Produced by a variety of cells within the lung, nitric oxide (NO) concentrations are generally low in healthy individuals. However, high concentrations of NO appear to be involved in nonspecific host defense mechanisms and chronic inflammatory  diseases such as asthma. The American Thoracic Society (ATS) therefore recommended using FENO to aid in the diagnosis and monitoring of eosinophilic airway inflammation and asthma, and for identifying steroid responsive individuals whose chronic respiratory symptoms may be caused by airway inflammation \par \par Sleep study revealed mild sleep apnea with an AHI/FLOYD of 9.9,  and a low oxygen saturation of 88%\par  \par \par \par \par -Images and procedures reviewed in detail and discussed with patient.

## 2022-07-27 NOTE — HISTORY OF PRESENT ILLNESS
[TextBox_4] : Ms. COLON is a 82 year female with a history of former remote smoker 15 years, DM, HLD, ovarian cyst, hypothyroid, PE diagnosed 12/2021 (on 6 months Eliquis- 1 week left) who now comes in for a f/u pulmonary evaluation. Her chief complaint is\par -she is feeling well in general \par -she notes intermittent allergy sx, now quiet \par -she has been misdiagnosed with COPD 15 years ago \par -she notes mild asthma sx \par -she is now using ventolin prn with significant improvement when needed \par -she is exercising regularly\par -her balance is stable \par -her wheezing has remained quiet\par -she is sleeping well in general, and is practicing good sleep hygiene \par \par -patient denies any headaches, nausea, vomiting, fever, chills, sweats, chest pain, chest pressure, palpitations, coughing, fatigue, diarrhea, constipation, dysphagia, myalgias, dizziness, leg swelling, leg pain, itchy eyes, itchy ears, heartburn, reflux or sour taste in the mouth

## 2022-08-11 ENCOUNTER — NON-APPOINTMENT (OUTPATIENT)
Age: 82
End: 2022-08-11

## 2022-08-15 ENCOUNTER — NON-APPOINTMENT (OUTPATIENT)
Age: 82
End: 2022-08-15

## 2022-09-20 ENCOUNTER — NON-APPOINTMENT (OUTPATIENT)
Age: 82
End: 2022-09-20

## 2022-12-06 ENCOUNTER — APPOINTMENT (OUTPATIENT)
Dept: ORTHOPEDIC SURGERY | Facility: CLINIC | Age: 82
End: 2022-12-06

## 2022-12-06 VITALS — WEIGHT: 151 LBS | BODY MASS INDEX: 29.64 KG/M2 | HEIGHT: 60 IN

## 2022-12-06 DIAGNOSIS — S62.619A DISPLACED FRACTURE OF PROXIMAL PHALANX OF UNSPECIFIED FINGER, INITIAL ENCOUNTER FOR CLOSED FRACTURE: ICD-10-CM

## 2022-12-06 DIAGNOSIS — M19.042 PRIMARY OSTEOARTHRITIS, LEFT HAND: ICD-10-CM

## 2022-12-06 PROCEDURE — A4565: CPT

## 2022-12-06 PROCEDURE — 29125 APPL SHORT ARM SPLINT STATIC: CPT | Mod: LT

## 2022-12-06 PROCEDURE — 99203 OFFICE O/P NEW LOW 30 MIN: CPT | Mod: 25

## 2022-12-06 PROCEDURE — 73130 X-RAY EXAM OF HAND: CPT | Mod: LT

## 2022-12-06 RX ORDER — APIXABAN 5 MG/1
5 TABLET, FILM COATED ORAL
Qty: 180 | Refills: 0 | Status: ACTIVE | COMMUNITY
Start: 2022-09-07

## 2022-12-06 RX ORDER — BLOOD SUGAR DIAGNOSTIC
STRIP MISCELLANEOUS
Qty: 100 | Refills: 0 | Status: COMPLETED | COMMUNITY
Start: 2021-06-24 | End: 2022-12-06

## 2022-12-06 RX ORDER — LANCETS 33 GAUGE
EACH MISCELLANEOUS
Qty: 100 | Refills: 0 | Status: ACTIVE | COMMUNITY
Start: 2021-12-16

## 2022-12-06 RX ORDER — ALPRAZOLAM 0.25 MG/1
0.25 TABLET ORAL
Qty: 90 | Refills: 0 | Status: ACTIVE | COMMUNITY
Start: 2022-08-10

## 2022-12-06 NOTE — HISTORY OF PRESENT ILLNESS
[Gradual] : gradual [9] : 9 [5] : 5 [Dull/Aching] : dull/aching [Sharp] : sharp [Intermittent] : intermittent [Household chores] : household chores [Leisure] : leisure [Rest] : rest [Meds] : meds [Ice] : ice [Retired] : Work status: retired [] : Post Surgical Visit: no

## 2022-12-06 NOTE — PHYSICAL EXAM
[4th] : 4th [4th Finger] : 4th finger [5th Finger] : 5th finger [5th] : 5th [MCP Joint] : MCP joint [Proximal Phalanx] : proximal phalanx [] : good capillary refill in all fingers [Left] : left hand [Fracture] : Fracture [FreeTextEntry1] : comminuted fracture base of prox phalanx, little finger, intra-articular, no dislocation, slight angulation. After splint and reduction, LF is aligned.

## 2022-12-06 NOTE — REASON FOR VISIT
[FreeTextEntry2] : Patient is a 82 year old RHD female who is added on today after she took a misstep this morning and banged her L Hand and little Finger, they are swollen.Painful when moving. She is on Eliquis

## 2022-12-09 ENCOUNTER — APPOINTMENT (OUTPATIENT)
Dept: PULMONOLOGY | Facility: CLINIC | Age: 82
End: 2022-12-09

## 2022-12-12 ENCOUNTER — APPOINTMENT (OUTPATIENT)
Dept: ORTHOPEDIC SURGERY | Facility: CLINIC | Age: 82
End: 2022-12-12

## 2022-12-12 VITALS — HEIGHT: 60 IN | BODY MASS INDEX: 29.64 KG/M2 | WEIGHT: 151 LBS

## 2022-12-12 DIAGNOSIS — S62.607B: ICD-10-CM

## 2022-12-12 PROCEDURE — 99213 OFFICE O/P EST LOW 20 MIN: CPT

## 2022-12-12 PROCEDURE — 73130 X-RAY EXAM OF HAND: CPT | Mod: LT

## 2022-12-12 NOTE — HISTORY OF PRESENT ILLNESS
[Sudden] : sudden [Dull/Aching] : dull/aching [Household chores] : household chores [Leisure] : leisure [Rest] : rest [Retired] : Work status: retired [] : Post Surgical Visit: no [de-identified] : 12/6/2022 [de-identified] : Dr. Turcios

## 2022-12-12 NOTE — REASON FOR VISIT
[FreeTextEntry2] : Patient feels improvement L Hand since last visit. Some continued pain with certain movement.\par

## 2022-12-12 NOTE — ASSESSMENT
[FreeTextEntry1] : continue with cast care, elevation. Discussed removing splint next week, examine and re-xray.

## 2022-12-12 NOTE — PHYSICAL EXAM
[] : light touch intact throughout [Left] : left hand [The fracture is in acceptable alignment. There is progression in healing seen] : The fracture is in acceptable alignment. There is progression in healing seen [FreeTextEntry3] : in ulnar gutter splint; ace bandages removed and rewrapped with new ace bandages [FreeTextEntry1] : prox phal fx LF comminuted intraarticular, nondisplaced, no angulation

## 2022-12-20 ENCOUNTER — APPOINTMENT (OUTPATIENT)
Dept: ORTHOPEDIC SURGERY | Facility: CLINIC | Age: 82
End: 2022-12-20

## 2022-12-20 VITALS — WEIGHT: 151 LBS | HEIGHT: 60 IN | BODY MASS INDEX: 29.64 KG/M2

## 2022-12-20 PROCEDURE — 73130 X-RAY EXAM OF HAND: CPT | Mod: LT

## 2022-12-20 PROCEDURE — 99213 OFFICE O/P EST LOW 20 MIN: CPT

## 2022-12-20 NOTE — ASSESSMENT
[FreeTextEntry1] : will remain out of the splint. Samir taping done RF, LF. Instructions given to patient

## 2022-12-20 NOTE — PHYSICAL EXAM
[Palm] : palm [4th Finger] : 4th finger [5th Finger] : 5th finger [Proximal Phalanx] : proximal phalanx [4th] : 4th [5th] : 5th [] : light touch intact throughout [Left] : left hand [The fracture is in acceptable alignment. There is progression in healing seen] : The fracture is in acceptable alignment. There is progression in healing seen [FreeTextEntry3] : splint and ace bandage removed prior to xray [FreeTextEntry9] : ROM not tested, but no rotational deformity LF [FreeTextEntry1] : comminuted fracture base proximal phalanx little finger, minimally displaced, intraarticular, joint intact

## 2022-12-20 NOTE — REASON FOR VISIT
[FreeTextEntry2] : Patient complains of continued L Hand pain since her last visit. Patient states that pain increases as the day goes on. Patient occasionally takes Tylenol which is helpful. In splint.

## 2022-12-20 NOTE — HISTORY OF PRESENT ILLNESS
[5] : 5 [Dull/Aching] : dull/aching [Massage] : massage [Retired] : Work status: retired [] : Post Surgical Visit: no [de-identified] : 12/12/22 [de-identified] : Dr. Turcios

## 2022-12-30 ENCOUNTER — APPOINTMENT (OUTPATIENT)
Dept: ORTHOPEDIC SURGERY | Facility: CLINIC | Age: 82
End: 2022-12-30
Payer: MEDICARE

## 2022-12-30 VITALS — WEIGHT: 151 LBS | HEIGHT: 60 IN | BODY MASS INDEX: 29.64 KG/M2

## 2022-12-30 PROCEDURE — 99213 OFFICE O/P EST LOW 20 MIN: CPT

## 2022-12-30 PROCEDURE — 73130 X-RAY EXAM OF HAND: CPT | Mod: LT

## 2022-12-30 NOTE — REASON FOR VISIT
[FreeTextEntry2] : Patient complains of continued pain L Hand. Pain wakes her. Has been christina taping the fingers.

## 2022-12-30 NOTE — HISTORY OF PRESENT ILLNESS
[Retired] : Work status: retired [] : Post Surgical Visit: no [de-identified] : 12/20/2022 [de-identified] : Dr. Turcios

## 2022-12-30 NOTE — PHYSICAL EXAM
[5th Finger] : 5th finger [Proximal Phalanx] : proximal phalanx [4th] : 4th [5th] : 5th [Left] : left hand [The fracture is in acceptable alignment. There is progression in healing seen] : The fracture is in acceptable alignment. There is progression in healing seen [Finger Flexion] : finger flexion [Finger Extension] : finger extension [] : no ecchymosis [FreeTextEntry3] : splint and ace bandage removed prior to xray [FreeTextEntry9] : stiffness with ROM little finger.  no rotational deformity LF [FreeTextEntry1] : comminuted fracture base proximal phalanx little finger, minimally displaced, intraarticular, joint intact

## 2023-01-10 ENCOUNTER — APPOINTMENT (OUTPATIENT)
Dept: ORTHOPEDIC SURGERY | Facility: CLINIC | Age: 83
End: 2023-01-10
Payer: MEDICARE

## 2023-01-10 VITALS — WEIGHT: 151 LBS | HEIGHT: 60 IN | BODY MASS INDEX: 29.64 KG/M2

## 2023-01-10 DIAGNOSIS — S62.617A DISPLACED FRACTURE OF PROXIMAL PHALANX OF LEFT LITTLE FINGER, INITIAL ENCOUNTER FOR CLOSED FRACTURE: ICD-10-CM

## 2023-01-10 PROCEDURE — 73130 X-RAY EXAM OF HAND: CPT | Mod: LT

## 2023-01-10 PROCEDURE — 99213 OFFICE O/P EST LOW 20 MIN: CPT

## 2023-01-10 NOTE — REASON FOR VISIT
[FreeTextEntry2] : Patient feels improvement in her L Fifth Finger since her last visit. Patent states that ROM has improved. Patient has been doing Home Exercises which has been helpful.

## 2023-01-10 NOTE — PHYSICAL EXAM
[5th] : 5th [MCP Joint] : MCP joint [] : good active flexion and extension of all finger joints [Left] : left hand [The fracture is in acceptable alignment. There is progression in healing seen] : The fracture is in acceptable alignment. There is progression in healing seen [FreeTextEntry9] : no rotational deformity with F/E little finger

## 2023-01-10 NOTE — HISTORY OF PRESENT ILLNESS
[Gradual] : gradual [2] : 2 [Dull/Aching] : dull/aching [Intermittent] : intermittent [Retired] : Work status: retired [] : Post Surgical Visit: no [de-identified] : 12/30/22 [de-identified] : Dr. Turcios

## 2023-02-08 ENCOUNTER — APPOINTMENT (OUTPATIENT)
Dept: ORTHOPEDIC SURGERY | Facility: CLINIC | Age: 83
End: 2023-02-08
Payer: MEDICARE

## 2023-02-08 VITALS — WEIGHT: 151 LBS | HEIGHT: 60 IN | BODY MASS INDEX: 29.64 KG/M2

## 2023-02-08 DIAGNOSIS — S62.647D NONDISPLACED FRACTURE OF PROXIMAL PHALANX OF LEFT LITTLE FINGER, SUBSEQUENT ENCOUNTER FOR FRACTURE WITH ROUTINE HEALING: ICD-10-CM

## 2023-02-08 PROCEDURE — 73130 X-RAY EXAM OF HAND: CPT | Mod: LT

## 2023-02-08 PROCEDURE — 99212 OFFICE O/P EST SF 10 MIN: CPT

## 2023-02-08 NOTE — PHYSICAL EXAM
[5th] : 5th [MCP Joint] : MCP joint [Left] : left hand [The fracture is in acceptable alignment. There is progression in healing seen] : The fracture is in acceptable alignment. There is progression in healing seen [] : no tenderness at fracture site [FreeTextEntry9] : no rotational deformity with F/E little finger

## 2023-02-08 NOTE — REASON FOR VISIT
[FreeTextEntry2] : Patient feels improvement in her Left Fifth Finger since her last visit. ROM has improved. Patient applies ice to the area which has been helpful. Patient feels pain with extended periods of movement.

## 2023-02-08 NOTE — HISTORY OF PRESENT ILLNESS
[Gradual] : gradual [1] : 2 [Dull/Aching] : dull/aching [Intermittent] : intermittent [Ice] : ice [Retired] : Work status: retired [] : Post Surgical Visit: no [de-identified] : 01/10/23 [de-identified] : Dr. Turcios

## 2023-12-29 ENCOUNTER — EMERGENCY (EMERGENCY)
Facility: HOSPITAL | Age: 83
LOS: 1 days | Discharge: HOME | End: 2023-12-29
Attending: EMERGENCY MEDICINE | Admitting: EMERGENCY MEDICINE
Payer: MEDICARE

## 2023-12-29 VITALS
DIASTOLIC BLOOD PRESSURE: 68 MMHG | TEMPERATURE: 98 F | SYSTOLIC BLOOD PRESSURE: 119 MMHG | RESPIRATION RATE: 18 BRPM | HEART RATE: 66 BPM | OXYGEN SATURATION: 98 %

## 2023-12-29 VITALS
RESPIRATION RATE: 17 BRPM | HEIGHT: 61 IN | OXYGEN SATURATION: 94 % | DIASTOLIC BLOOD PRESSURE: 87 MMHG | SYSTOLIC BLOOD PRESSURE: 138 MMHG | HEART RATE: 86 BPM | TEMPERATURE: 100 F | WEIGHT: 154.98 LBS

## 2023-12-29 DIAGNOSIS — Z90.721 ACQUIRED ABSENCE OF OVARIES, UNILATERAL: Chronic | ICD-10-CM

## 2023-12-29 LAB
ALBUMIN SERPL ELPH-MCNC: 3 G/DL — LOW (ref 3.3–5)
ALBUMIN SERPL ELPH-MCNC: 3 G/DL — LOW (ref 3.3–5)
ALP SERPL-CCNC: 95 U/L — SIGNIFICANT CHANGE UP (ref 30–120)
ALP SERPL-CCNC: 95 U/L — SIGNIFICANT CHANGE UP (ref 30–120)
ALT FLD-CCNC: 20 U/L — SIGNIFICANT CHANGE UP (ref 10–60)
ALT FLD-CCNC: 20 U/L — SIGNIFICANT CHANGE UP (ref 10–60)
ANION GAP SERPL CALC-SCNC: 10 MMOL/L — SIGNIFICANT CHANGE UP (ref 5–17)
ANION GAP SERPL CALC-SCNC: 10 MMOL/L — SIGNIFICANT CHANGE UP (ref 5–17)
APPEARANCE UR: CLEAR — SIGNIFICANT CHANGE UP
APPEARANCE UR: CLEAR — SIGNIFICANT CHANGE UP
APTT BLD: 35 SEC — SIGNIFICANT CHANGE UP (ref 24.5–35.6)
APTT BLD: 35 SEC — SIGNIFICANT CHANGE UP (ref 24.5–35.6)
AST SERPL-CCNC: 18 U/L — SIGNIFICANT CHANGE UP (ref 10–40)
AST SERPL-CCNC: 18 U/L — SIGNIFICANT CHANGE UP (ref 10–40)
BASOPHILS # BLD AUTO: 0.03 K/UL — SIGNIFICANT CHANGE UP (ref 0–0.2)
BASOPHILS # BLD AUTO: 0.03 K/UL — SIGNIFICANT CHANGE UP (ref 0–0.2)
BASOPHILS NFR BLD AUTO: 0.3 % — SIGNIFICANT CHANGE UP (ref 0–2)
BASOPHILS NFR BLD AUTO: 0.3 % — SIGNIFICANT CHANGE UP (ref 0–2)
BILIRUB SERPL-MCNC: 0.4 MG/DL — SIGNIFICANT CHANGE UP (ref 0.2–1.2)
BILIRUB SERPL-MCNC: 0.4 MG/DL — SIGNIFICANT CHANGE UP (ref 0.2–1.2)
BILIRUB UR-MCNC: NEGATIVE — SIGNIFICANT CHANGE UP
BILIRUB UR-MCNC: NEGATIVE — SIGNIFICANT CHANGE UP
BUN SERPL-MCNC: 27 MG/DL — HIGH (ref 7–23)
BUN SERPL-MCNC: 27 MG/DL — HIGH (ref 7–23)
CALCIUM SERPL-MCNC: 8.6 MG/DL — SIGNIFICANT CHANGE UP (ref 8.4–10.5)
CALCIUM SERPL-MCNC: 8.6 MG/DL — SIGNIFICANT CHANGE UP (ref 8.4–10.5)
CHLORIDE SERPL-SCNC: 101 MMOL/L — SIGNIFICANT CHANGE UP (ref 96–108)
CHLORIDE SERPL-SCNC: 101 MMOL/L — SIGNIFICANT CHANGE UP (ref 96–108)
CO2 SERPL-SCNC: 27 MMOL/L — SIGNIFICANT CHANGE UP (ref 22–31)
CO2 SERPL-SCNC: 27 MMOL/L — SIGNIFICANT CHANGE UP (ref 22–31)
COLOR SPEC: YELLOW — SIGNIFICANT CHANGE UP
COLOR SPEC: YELLOW — SIGNIFICANT CHANGE UP
CREAT SERPL-MCNC: 0.98 MG/DL — SIGNIFICANT CHANGE UP (ref 0.5–1.3)
CREAT SERPL-MCNC: 0.98 MG/DL — SIGNIFICANT CHANGE UP (ref 0.5–1.3)
DIFF PNL FLD: ABNORMAL
DIFF PNL FLD: ABNORMAL
EGFR: 57 ML/MIN/1.73M2 — LOW
EGFR: 57 ML/MIN/1.73M2 — LOW
EOSINOPHIL # BLD AUTO: 0.02 K/UL — SIGNIFICANT CHANGE UP (ref 0–0.5)
EOSINOPHIL # BLD AUTO: 0.02 K/UL — SIGNIFICANT CHANGE UP (ref 0–0.5)
EOSINOPHIL NFR BLD AUTO: 0.2 % — SIGNIFICANT CHANGE UP (ref 0–6)
EOSINOPHIL NFR BLD AUTO: 0.2 % — SIGNIFICANT CHANGE UP (ref 0–6)
GLUCOSE SERPL-MCNC: 122 MG/DL — HIGH (ref 70–99)
GLUCOSE SERPL-MCNC: 122 MG/DL — HIGH (ref 70–99)
GLUCOSE UR QL: NEGATIVE MG/DL — SIGNIFICANT CHANGE UP
GLUCOSE UR QL: NEGATIVE MG/DL — SIGNIFICANT CHANGE UP
HCT VFR BLD CALC: 41.7 % — SIGNIFICANT CHANGE UP (ref 34.5–45)
HCT VFR BLD CALC: 41.7 % — SIGNIFICANT CHANGE UP (ref 34.5–45)
HGB BLD-MCNC: 13.4 G/DL — SIGNIFICANT CHANGE UP (ref 11.5–15.5)
HGB BLD-MCNC: 13.4 G/DL — SIGNIFICANT CHANGE UP (ref 11.5–15.5)
IMM GRANULOCYTES NFR BLD AUTO: 0.3 % — SIGNIFICANT CHANGE UP (ref 0–0.9)
IMM GRANULOCYTES NFR BLD AUTO: 0.3 % — SIGNIFICANT CHANGE UP (ref 0–0.9)
INR BLD: 1.22 RATIO — HIGH (ref 0.85–1.18)
INR BLD: 1.22 RATIO — HIGH (ref 0.85–1.18)
KETONES UR-MCNC: NEGATIVE MG/DL — SIGNIFICANT CHANGE UP
KETONES UR-MCNC: NEGATIVE MG/DL — SIGNIFICANT CHANGE UP
LACTATE SERPL-SCNC: 1.2 MMOL/L — SIGNIFICANT CHANGE UP (ref 0.7–2)
LACTATE SERPL-SCNC: 1.2 MMOL/L — SIGNIFICANT CHANGE UP (ref 0.7–2)
LEUKOCYTE ESTERASE UR-ACNC: NEGATIVE — SIGNIFICANT CHANGE UP
LEUKOCYTE ESTERASE UR-ACNC: NEGATIVE — SIGNIFICANT CHANGE UP
LYMPHOCYTES # BLD AUTO: 0.6 K/UL — LOW (ref 1–3.3)
LYMPHOCYTES # BLD AUTO: 0.6 K/UL — LOW (ref 1–3.3)
LYMPHOCYTES # BLD AUTO: 5.1 % — LOW (ref 13–44)
LYMPHOCYTES # BLD AUTO: 5.1 % — LOW (ref 13–44)
MCHC RBC-ENTMCNC: 28.9 PG — SIGNIFICANT CHANGE UP (ref 27–34)
MCHC RBC-ENTMCNC: 28.9 PG — SIGNIFICANT CHANGE UP (ref 27–34)
MCHC RBC-ENTMCNC: 32.1 GM/DL — SIGNIFICANT CHANGE UP (ref 32–36)
MCHC RBC-ENTMCNC: 32.1 GM/DL — SIGNIFICANT CHANGE UP (ref 32–36)
MCV RBC AUTO: 90.1 FL — SIGNIFICANT CHANGE UP (ref 80–100)
MCV RBC AUTO: 90.1 FL — SIGNIFICANT CHANGE UP (ref 80–100)
MONOCYTES # BLD AUTO: 0.35 K/UL — SIGNIFICANT CHANGE UP (ref 0–0.9)
MONOCYTES # BLD AUTO: 0.35 K/UL — SIGNIFICANT CHANGE UP (ref 0–0.9)
MONOCYTES NFR BLD AUTO: 3 % — SIGNIFICANT CHANGE UP (ref 2–14)
MONOCYTES NFR BLD AUTO: 3 % — SIGNIFICANT CHANGE UP (ref 2–14)
NEUTROPHILS # BLD AUTO: 10.65 K/UL — HIGH (ref 1.8–7.4)
NEUTROPHILS # BLD AUTO: 10.65 K/UL — HIGH (ref 1.8–7.4)
NEUTROPHILS NFR BLD AUTO: 91.1 % — HIGH (ref 43–77)
NEUTROPHILS NFR BLD AUTO: 91.1 % — HIGH (ref 43–77)
NITRITE UR-MCNC: NEGATIVE — SIGNIFICANT CHANGE UP
NITRITE UR-MCNC: NEGATIVE — SIGNIFICANT CHANGE UP
NRBC # BLD: 0 /100 WBCS — SIGNIFICANT CHANGE UP (ref 0–0)
NRBC # BLD: 0 /100 WBCS — SIGNIFICANT CHANGE UP (ref 0–0)
PH UR: 5 — SIGNIFICANT CHANGE UP (ref 5–8)
PH UR: 5 — SIGNIFICANT CHANGE UP (ref 5–8)
PLATELET # BLD AUTO: 221 K/UL — SIGNIFICANT CHANGE UP (ref 150–400)
PLATELET # BLD AUTO: 221 K/UL — SIGNIFICANT CHANGE UP (ref 150–400)
POTASSIUM SERPL-MCNC: 3.9 MMOL/L — SIGNIFICANT CHANGE UP (ref 3.5–5.3)
POTASSIUM SERPL-MCNC: 3.9 MMOL/L — SIGNIFICANT CHANGE UP (ref 3.5–5.3)
POTASSIUM SERPL-SCNC: 3.9 MMOL/L — SIGNIFICANT CHANGE UP (ref 3.5–5.3)
POTASSIUM SERPL-SCNC: 3.9 MMOL/L — SIGNIFICANT CHANGE UP (ref 3.5–5.3)
PROT SERPL-MCNC: 6.7 G/DL — SIGNIFICANT CHANGE UP (ref 6–8.3)
PROT SERPL-MCNC: 6.7 G/DL — SIGNIFICANT CHANGE UP (ref 6–8.3)
PROT UR-MCNC: NEGATIVE MG/DL — SIGNIFICANT CHANGE UP
PROT UR-MCNC: NEGATIVE MG/DL — SIGNIFICANT CHANGE UP
PROTHROM AB SERPL-ACNC: 13.2 SEC — HIGH (ref 9.5–13)
PROTHROM AB SERPL-ACNC: 13.2 SEC — HIGH (ref 9.5–13)
RAPID RVP RESULT: SIGNIFICANT CHANGE UP
RAPID RVP RESULT: SIGNIFICANT CHANGE UP
RBC # BLD: 4.63 M/UL — SIGNIFICANT CHANGE UP (ref 3.8–5.2)
RBC # BLD: 4.63 M/UL — SIGNIFICANT CHANGE UP (ref 3.8–5.2)
RBC # FLD: 12.5 % — SIGNIFICANT CHANGE UP (ref 10.3–14.5)
RBC # FLD: 12.5 % — SIGNIFICANT CHANGE UP (ref 10.3–14.5)
SARS-COV-2 RNA SPEC QL NAA+PROBE: SIGNIFICANT CHANGE UP
SARS-COV-2 RNA SPEC QL NAA+PROBE: SIGNIFICANT CHANGE UP
SODIUM SERPL-SCNC: 138 MMOL/L — SIGNIFICANT CHANGE UP (ref 135–145)
SODIUM SERPL-SCNC: 138 MMOL/L — SIGNIFICANT CHANGE UP (ref 135–145)
SP GR SPEC: 1.03 — SIGNIFICANT CHANGE UP (ref 1–1.03)
SP GR SPEC: 1.03 — SIGNIFICANT CHANGE UP (ref 1–1.03)
UROBILINOGEN FLD QL: 0.2 MG/DL — SIGNIFICANT CHANGE UP (ref 0.2–1)
UROBILINOGEN FLD QL: 0.2 MG/DL — SIGNIFICANT CHANGE UP (ref 0.2–1)
WBC # BLD: 11.69 K/UL — HIGH (ref 3.8–10.5)
WBC # BLD: 11.69 K/UL — HIGH (ref 3.8–10.5)
WBC # FLD AUTO: 11.69 K/UL — HIGH (ref 3.8–10.5)
WBC # FLD AUTO: 11.69 K/UL — HIGH (ref 3.8–10.5)

## 2023-12-29 PROCEDURE — 71045 X-RAY EXAM CHEST 1 VIEW: CPT

## 2023-12-29 PROCEDURE — 36415 COLL VENOUS BLD VENIPUNCTURE: CPT

## 2023-12-29 PROCEDURE — 87040 BLOOD CULTURE FOR BACTERIA: CPT

## 2023-12-29 PROCEDURE — 74176 CT ABD & PELVIS W/O CONTRAST: CPT | Mod: MA

## 2023-12-29 PROCEDURE — 74176 CT ABD & PELVIS W/O CONTRAST: CPT | Mod: 26,MA

## 2023-12-29 PROCEDURE — 87086 URINE CULTURE/COLONY COUNT: CPT

## 2023-12-29 PROCEDURE — 85610 PROTHROMBIN TIME: CPT

## 2023-12-29 PROCEDURE — 99285 EMERGENCY DEPT VISIT HI MDM: CPT | Mod: FS

## 2023-12-29 PROCEDURE — 85025 COMPLETE CBC W/AUTO DIFF WBC: CPT

## 2023-12-29 PROCEDURE — 83605 ASSAY OF LACTIC ACID: CPT

## 2023-12-29 PROCEDURE — 99285 EMERGENCY DEPT VISIT HI MDM: CPT | Mod: 25

## 2023-12-29 PROCEDURE — 96365 THER/PROPH/DIAG IV INF INIT: CPT

## 2023-12-29 PROCEDURE — 93005 ELECTROCARDIOGRAM TRACING: CPT

## 2023-12-29 PROCEDURE — 85730 THROMBOPLASTIN TIME PARTIAL: CPT

## 2023-12-29 PROCEDURE — 80053 COMPREHEN METABOLIC PANEL: CPT

## 2023-12-29 PROCEDURE — 0225U NFCT DS DNA&RNA 21 SARSCOV2: CPT

## 2023-12-29 PROCEDURE — 71045 X-RAY EXAM CHEST 1 VIEW: CPT | Mod: 26

## 2023-12-29 PROCEDURE — 93010 ELECTROCARDIOGRAM REPORT: CPT

## 2023-12-29 PROCEDURE — 96375 TX/PRO/DX INJ NEW DRUG ADDON: CPT

## 2023-12-29 PROCEDURE — 81001 URINALYSIS AUTO W/SCOPE: CPT

## 2023-12-29 RX ORDER — ONDANSETRON 8 MG/1
1 TABLET, FILM COATED ORAL
Qty: 6 | Refills: 0
Start: 2023-12-29

## 2023-12-29 RX ORDER — SODIUM CHLORIDE 9 MG/ML
1000 INJECTION INTRAMUSCULAR; INTRAVENOUS; SUBCUTANEOUS ONCE
Refills: 0 | Status: COMPLETED | OUTPATIENT
Start: 2023-12-29 | End: 2023-12-29

## 2023-12-29 RX ORDER — ACETAMINOPHEN 500 MG
1000 TABLET ORAL ONCE
Refills: 0 | Status: COMPLETED | OUTPATIENT
Start: 2023-12-29 | End: 2023-12-29

## 2023-12-29 RX ORDER — PIPERACILLIN AND TAZOBACTAM 4; .5 G/20ML; G/20ML
3.38 INJECTION, POWDER, LYOPHILIZED, FOR SOLUTION INTRAVENOUS ONCE
Refills: 0 | Status: COMPLETED | OUTPATIENT
Start: 2023-12-29 | End: 2023-12-29

## 2023-12-29 RX ORDER — ONDANSETRON 8 MG/1
4 TABLET, FILM COATED ORAL ONCE
Refills: 0 | Status: COMPLETED | OUTPATIENT
Start: 2023-12-29 | End: 2023-12-29

## 2023-12-29 RX ORDER — FAMOTIDINE 10 MG/ML
20 INJECTION INTRAVENOUS ONCE
Refills: 0 | Status: COMPLETED | OUTPATIENT
Start: 2023-12-29 | End: 2023-12-29

## 2023-12-29 RX ADMIN — Medication 1000 MILLIGRAM(S): at 18:31

## 2023-12-29 RX ADMIN — FAMOTIDINE 20 MILLIGRAM(S): 10 INJECTION INTRAVENOUS at 17:54

## 2023-12-29 RX ADMIN — Medication 400 MILLIGRAM(S): at 17:53

## 2023-12-29 RX ADMIN — PIPERACILLIN AND TAZOBACTAM 200 GRAM(S): 4; .5 INJECTION, POWDER, LYOPHILIZED, FOR SOLUTION INTRAVENOUS at 18:35

## 2023-12-29 RX ADMIN — Medication 1000 MILLIGRAM(S): at 18:10

## 2023-12-29 RX ADMIN — ONDANSETRON 4 MILLIGRAM(S): 8 TABLET, FILM COATED ORAL at 17:54

## 2023-12-29 RX ADMIN — SODIUM CHLORIDE 1000 MILLILITER(S): 9 INJECTION INTRAMUSCULAR; INTRAVENOUS; SUBCUTANEOUS at 17:55

## 2023-12-29 NOTE — ED PROVIDER NOTE - NSFOLLOWUPINSTRUCTIONS_ED_ALL_ED_FT
1) Follow up with your doctor in 1-2 days  2) Return to the ER for worsening or concerning symptoms      Vomiting, Adult  Vomiting is when stomach contents forcefully come out of the mouth. Many people notice nausea before vomiting. Vomiting can make you feel weak and cause you to become dehydrated.    Dehydration can make you feel tired and thirsty, cause you to have a dry mouth, and decrease how often you urinate. Older adults and people who have other diseases or a weak body defense system (immune system) are at higher risk for dehydration. It is important to treat vomiting as told by your health care provider.    Follow these instructions at home:  Washing hands with soap and water.  Watch your symptoms for any changes. Tell your health care provider about them.    Eating and drinking    Bananas next to a bowl of applesauce.  A bottle of clear fruit juice and glass of water.  Follow these recommendations as told by your health care provider:  Take an oral rehydration solution (ORS). This is a drink that is sold at pharmacies and retail stores.  Eat bland, easy-to-digest foods in small amounts as you are able. These foods include bananas, applesauce, rice, lean meats, toast, and crackers.  Drink clear fluids slowly and in small amounts as you are able. Clear fluids include water, ice chips, low-calorie sports drinks, and fruit juice that has water added (diluted fruit juice).  Avoid drinking fluids that contain a lot of sugar or caffeine, such as energy drinks, sports drinks, and soda.  Avoid alcohol.  Avoid spicy or fatty foods.  General instructions    Wash your hands often using soap and water for at least 20 seconds. If soap and water are not available, use hand .  Make sure that everyone in your household washes their hands frequently.  Take over-the-counter and prescription medicines only as told by your health care provider.  Rest at home while you recover.  Watch your condition for any changes.  Keep all follow-up visits. This is important.  Contact a health care provider if:  Your vomiting gets worse.  You have new symptoms.  You have a fever.  You cannot drink fluids without vomiting.  You feel light-headed or dizzy.  You have a headache.  You have muscle cramps.  You have a rash.  You have pain while urinating.  Get help right away if:  You have pain in your chest, neck, arm, or jaw.  Your heart is beating very quickly.  You have trouble breathing or you are breathing very quickly.  You feel extremely weak or you faint.  Your skin feels cold and clammy.  You feel confused.  You have persistent vomiting.  You have vomit that is bright red or looks like black coffee grounds.  You have stools (feces) that are bloody or black, or stools that look like tar.  You have a severe headache, a stiff neck, or both.  You have severe pain, cramping, or bloating in your abdomen.  You have signs of dehydration, such as:  Dark urine, very little urine, or no urine.  Cracked lips.  Dry mouth.  Sunken eyes.  Sleepiness.  Weakness.  These symptoms may be an emergency. Get help right away. Call 911.  Do not wait to see if the symptoms will go away.  Do not drive yourself to the hospital.  Summary  Vomiting is when stomach contents forcefully come out of the mouth. Vomiting can cause you to become dehydrated.  It is important to treat vomiting as told by your health care provider. Follow your health care provider's instructions about eating and drinking.  Wash your hands often using soap and water for at least 20 seconds. If soap and water are not available, use hand .  Watch your condition for any changes and for signs of dehydration.  Keep all follow-up visits. This is important.  This information is not intended to replace advice given to you by your health care provider. Make sure you discuss any questions you have with your health care provider.

## 2023-12-29 NOTE — ED PROVIDER NOTE - PROGRESS NOTE DETAILS
Pt signed out to me by Dr. William to reassess after PO trial.  Pt has tolerated PO, she drank water and ate a turkey sandwich.  Pt reports she feels well and would like to go home  Will contact pt's family for pickup as she does not want to go via ambulance.

## 2023-12-29 NOTE — ED ADULT NURSE NOTE - OBJECTIVE STATEMENT
patient A&Ox3 in no acute distress c/o of abdominal pain with N/V started this morning , denied chest pain no dysuria no blood in stool or urine

## 2023-12-29 NOTE — ED PROVIDER NOTE - CLINICAL SUMMARY MEDICAL DECISION MAKING FREE TEXT BOX
Patient brought in by EMS for abdominal pain nausea vomiting.  Patient reports low-grade fever at home and body aches.  Patient denies chest pain shortness of breath cough diarrhea dysuria hematuria frequency.  No known sick contacts.  Patient reports recent diverticulitis.    Plan EKG chest x-ray CT abdomen pelvis labs IV fluids Ofirmev Pepcid Zofran

## 2023-12-29 NOTE — ED ADULT NURSE NOTE - NSFALLUNIVINTERV_ED_ALL_ED
Bed/Stretcher in lowest position, wheels locked, appropriate side rails in place/Call bell, personal items and telephone in reach/Instruct patient to call for assistance before getting out of bed/chair/stretcher/Non-slip footwear applied when patient is off stretcher/[place] to call system/Physically safe environment - no spills, clutter or unnecessary equipment/Purposeful proactive rounding/Room/bathroom lighting operational, light cord in reach Bed/Stretcher in lowest position, wheels locked, appropriate side rails in place/Call bell, personal items and telephone in reach/Instruct patient to call for assistance before getting out of bed/chair/stretcher/Non-slip footwear applied when patient is off stretcher/[place] to call system/Physically safe environment - no spills, clutter or unnecessary equipment/Purposeful proactive rounding/Room/bathroom lighting operational, light cord in reach

## 2023-12-29 NOTE — ED PROVIDER NOTE - DATE/TIME 1
29-Dec-2023 21:19 Island Pedicle Flap With Canthal Suspension Text: In order to avoid distortion of nearby structures, an island pedicle flap was planned.  After prep and local anesthesia, a triangular incision was made.  The flap was dissected to a muscular subcutaneous pedicle.  The skin surrounding the flap was undermined to allow for closure of the donor-site defect. After hemostasis obtained, the flap was advanced into place and sutured in a layered fashion.   suspension suture was placed in the canthal tendon to prevent tension and prevent ectropion.

## 2023-12-29 NOTE — ED PROVIDER NOTE - PATIENT PORTAL LINK FT
You can access the FollowMyHealth Patient Portal offered by [facility] by registering at the following website: [email or web address]. By joining [employer]’s FollowMyHealth portal, you will also be able to view your health information using other applications (apps) compatible with our system. You can access the FollowMyHealth Patient Portal offered by [facility] by registering at the following website: [email or web address]. By joining [employer]’s FollowMyHealth portal, you will also be able to view your health information using other applications (apps) compatible with our system.

## 2023-12-29 NOTE — ED ADULT NURSE NOTE - PRO INTERPRETER NEED 2
Patient has appointment scheduled for tomorrow. Lab results will be discussed at visit with Dr Walters.   English

## 2023-12-29 NOTE — ED PROVIDER NOTE - OBJECTIVE STATEMENT
83-year-old female with history of hypothyroidism, DVT on Eliquis, hyperlipidemia, asthma, diabetes, diverticulitis, right oophorectomy is with complaint of vomiting and abdominal pain today.  Patient states that she had spicy Chinese food for lunch yesterday and had a turkey sandwich for dinner.  States that she woke up at 4:30 AM this morning with nausea and vomiting and intermittent lower abdominal pain.  States that her last bowel movement was yesterday.  Patient states that she has chills and low-grade fever, Tmax 99.4 F at home.  Denies taking any antipyretics prior to arrival. +body aches. Denies diarrhea, cough, chest pain, shortness of breath, dysuria, hematuria, sore throat, runny nose travel, known sick contacts, rash or other symptoms. States that she was treated for diverticulitis 3-4 weeks ago, had ct scan 2 weeks ago with resolution of infection.

## 2024-01-03 LAB
CULTURE RESULTS: SIGNIFICANT CHANGE UP
SPECIMEN SOURCE: SIGNIFICANT CHANGE UP

## 2024-04-12 ENCOUNTER — APPOINTMENT (OUTPATIENT)
Dept: GASTROENTEROLOGY | Facility: CLINIC | Age: 84
End: 2024-04-12
Payer: MEDICARE

## 2024-04-12 VITALS
DIASTOLIC BLOOD PRESSURE: 82 MMHG | BODY MASS INDEX: 29.84 KG/M2 | SYSTOLIC BLOOD PRESSURE: 118 MMHG | HEIGHT: 60 IN | WEIGHT: 152 LBS

## 2024-04-12 DIAGNOSIS — K58.0 IRRITABLE BOWEL SYNDROME WITH DIARRHEA: ICD-10-CM

## 2024-04-12 DIAGNOSIS — R10.9 UNSPECIFIED ABDOMINAL PAIN: ICD-10-CM

## 2024-04-12 PROCEDURE — 99203 OFFICE O/P NEW LOW 30 MIN: CPT

## 2024-04-12 RX ORDER — HYOSCYAMINE SULFATE 0.12 MG/1
0.12 TABLET, ORALLY DISINTEGRATING ORAL
Qty: 90 | Refills: 3 | Status: ACTIVE | COMMUNITY
Start: 2024-04-12 | End: 1900-01-01

## 2024-04-12 NOTE — ASSESSMENT
[FreeTextEntry1] : 82 yo female with history of IBS symptoms with diarrhea with intermittent bouts.  Patient does not wish to be on long and.  Patient advised to stay on a high-fiber diet including Benefiber daily.  Patient to use daily probiotics.  Empiric trial of as needed hyoscyamine.  Follow up in three months to assess response.

## 2024-04-12 NOTE — PHYSICAL EXAM
[Alert] : alert [Normal Voice/Communication] : normal voice/communication [Healthy Appearing] : healthy appearing [No Acute Distress] : no acute distress [Sclera] : the sclera and conjunctiva were normal [Hearing Threshold Finger Rub Not Trimble] : hearing was normal [Normal Lips/Gums] : the lips and gums were normal [Normal Appearance] : the appearance of the neck was normal [Oropharynx] : the oropharynx was normal [No Neck Mass] : no neck mass was observed [No Respiratory Distress] : no respiratory distress [No Acc Muscle Use] : no accessory muscle use [Respiration, Rhythm And Depth] : normal respiratory rhythm and effort [Auscultation Breath Sounds / Voice Sounds] : lungs were clear to auscultation bilaterally [Heart Rate And Rhythm] : heart rate was normal and rhythm regular [Normal S1, S2] : normal S1 and S2 [Murmurs] : no murmurs [Bowel Sounds] : normal bowel sounds [Abdomen Tenderness] : non-tender [No Masses] : no abdominal mass palpated [Abdomen Soft] : soft [] : no hepatosplenomegaly [Oriented To Time, Place, And Person] : oriented to person, place, and time

## 2024-04-12 NOTE — HISTORY OF PRESENT ILLNESS
[FreeTextEntry1] : Ms. ARIAN COLON is a 83 year old female with history of intermittent bouts of crampy abdominal discomfort and diarrhea.  Patient has seen several Gastroenterologists in the past has had extensive workup including laboratory tests and CT scans all of which have been unremarkable.  Patient has not taken prior medications for the symptoms.  There has been no bleeding or weight loss.  There are no nocturnal symptoms.  No complaints of heartburn.  There is no family history of colon cancer.  Patient reports last colonoscopy was 5 years ago and was unremarkable.

## 2024-07-17 ENCOUNTER — APPOINTMENT (OUTPATIENT)
Dept: GASTROENTEROLOGY | Facility: CLINIC | Age: 84
End: 2024-07-17
Payer: MEDICARE

## 2024-07-17 VITALS
HEIGHT: 60 IN | SYSTOLIC BLOOD PRESSURE: 120 MMHG | WEIGHT: 149 LBS | BODY MASS INDEX: 29.25 KG/M2 | DIASTOLIC BLOOD PRESSURE: 78 MMHG

## 2024-07-17 DIAGNOSIS — K58.0 IRRITABLE BOWEL SYNDROME WITH DIARRHEA: ICD-10-CM

## 2024-07-17 PROCEDURE — 99213 OFFICE O/P EST LOW 20 MIN: CPT

## 2024-12-26 ENCOUNTER — APPOINTMENT (OUTPATIENT)
Dept: GASTROENTEROLOGY | Facility: CLINIC | Age: 84
End: 2024-12-26
Payer: MEDICARE

## 2024-12-26 VITALS
HEIGHT: 61 IN | DIASTOLIC BLOOD PRESSURE: 78 MMHG | WEIGHT: 149 LBS | SYSTOLIC BLOOD PRESSURE: 122 MMHG | BODY MASS INDEX: 28.13 KG/M2

## 2024-12-26 DIAGNOSIS — Z09 ENCOUNTER FOR FOLLOW-UP EXAMINATION AFTER COMPLETED TREATMENT FOR CONDITIONS OTHER THAN MALIGNANT NEOPLASM: ICD-10-CM

## 2024-12-26 DIAGNOSIS — K58.0 IRRITABLE BOWEL SYNDROME WITH DIARRHEA: ICD-10-CM

## 2024-12-26 PROCEDURE — 99212 OFFICE O/P EST SF 10 MIN: CPT

## 2025-03-29 NOTE — ED ADULT NURSE NOTE - BOWEL SOUNDS RUQ
Gregory was given a one-time dose of IV antibiotics, and does not require further treatment at this time.  He can follow with his primary care physician.  If he develops fevers, chills, vomiting or other concerning symptoms please bring him back to the emergency department for reevaluation.   present

## 2025-04-23 ENCOUNTER — APPOINTMENT (OUTPATIENT)
Dept: GASTROENTEROLOGY | Facility: CLINIC | Age: 85
End: 2025-04-23
Payer: MEDICARE

## 2025-04-23 ENCOUNTER — NON-APPOINTMENT (OUTPATIENT)
Age: 85
End: 2025-04-23

## 2025-04-23 VITALS
WEIGHT: 149 LBS | DIASTOLIC BLOOD PRESSURE: 68 MMHG | HEIGHT: 61 IN | SYSTOLIC BLOOD PRESSURE: 114 MMHG | BODY MASS INDEX: 28.13 KG/M2

## 2025-04-23 DIAGNOSIS — R10.9 UNSPECIFIED ABDOMINAL PAIN: ICD-10-CM

## 2025-04-23 DIAGNOSIS — K58.0 IRRITABLE BOWEL SYNDROME WITH DIARRHEA: ICD-10-CM

## 2025-04-23 PROCEDURE — 99213 OFFICE O/P EST LOW 20 MIN: CPT

## 2025-04-23 PROCEDURE — G2211 COMPLEX E/M VISIT ADD ON: CPT

## 2025-04-23 RX ORDER — HYOSCYAMINE SULFATE 0.38 MG/1
0.38 TABLET, EXTENDED RELEASE ORAL
Qty: 60 | Refills: 6 | Status: ACTIVE | COMMUNITY
Start: 2025-04-23 | End: 1900-01-01

## 2025-05-15 ENCOUNTER — RX CHANGE (OUTPATIENT)
Age: 85
End: 2025-05-15

## 2025-05-15 RX ORDER — HYOSCYAMINE SULFATE 0.38 MG/1
0.38 TABLET, EXTENDED RELEASE ORAL
Qty: 180 | Refills: 3 | Status: ACTIVE | COMMUNITY
Start: 1900-01-01 | End: 1900-01-01

## 2025-05-28 ENCOUNTER — APPOINTMENT (OUTPATIENT)
Dept: GASTROENTEROLOGY | Facility: CLINIC | Age: 85
End: 2025-05-28
Payer: MEDICARE

## 2025-05-28 VITALS
SYSTOLIC BLOOD PRESSURE: 128 MMHG | HEIGHT: 61 IN | WEIGHT: 146 LBS | BODY MASS INDEX: 27.56 KG/M2 | DIASTOLIC BLOOD PRESSURE: 74 MMHG

## 2025-05-28 DIAGNOSIS — Z79.899 OTHER LONG TERM (CURRENT) DRUG THERAPY: ICD-10-CM

## 2025-05-28 DIAGNOSIS — K58.0 IRRITABLE BOWEL SYNDROME WITH DIARRHEA: ICD-10-CM

## 2025-05-28 PROCEDURE — 99212 OFFICE O/P EST SF 10 MIN: CPT
